# Patient Record
Sex: MALE | Race: BLACK OR AFRICAN AMERICAN | Employment: PART TIME | URBAN - METROPOLITAN AREA
[De-identification: names, ages, dates, MRNs, and addresses within clinical notes are randomized per-mention and may not be internally consistent; named-entity substitution may affect disease eponyms.]

---

## 2017-06-08 ENCOUNTER — HOSPITAL ENCOUNTER (OUTPATIENT)
Dept: RADIATION THERAPY | Age: 67
Discharge: HOME | End: 2017-06-08

## 2017-06-15 ENCOUNTER — HOSPITAL ENCOUNTER (OUTPATIENT)
Dept: MRI IMAGING | Age: 67
Discharge: HOME | End: 2017-06-15
Attending: RADIOLOGY
Payer: MEDICARE

## 2017-06-15 DIAGNOSIS — R97.20 ELEVATED PSA: ICD-10-CM

## 2017-06-15 DIAGNOSIS — C61 MALIGNANT NEOPLASM OF PROSTATE (HCC): ICD-10-CM

## 2017-06-15 DIAGNOSIS — M54.9 BACK PAIN: ICD-10-CM

## 2017-06-15 LAB — CREAT BLD-MCNC: 1.4 MG/DL (ref 0.6–1.3)

## 2017-06-15 PROCEDURE — 72158 MRI LUMBAR SPINE W/O & W/DYE: CPT

## 2017-06-15 PROCEDURE — 74011250636 HC RX REV CODE- 250/636: Performed by: RADIOLOGY

## 2017-06-15 PROCEDURE — 82565 ASSAY OF CREATININE: CPT

## 2017-06-15 PROCEDURE — A9577 INJ MULTIHANCE: HCPCS | Performed by: RADIOLOGY

## 2017-06-15 RX ADMIN — GADOBENATE DIMEGLUMINE 15 ML: 529 INJECTION, SOLUTION INTRAVENOUS at 17:38

## 2018-10-10 ENCOUNTER — HOSPITAL ENCOUNTER (OUTPATIENT)
Age: 68
Setting detail: OUTPATIENT SURGERY
Discharge: HOME | End: 2018-10-10
Attending: INTERNAL MEDICINE | Admitting: INTERNAL MEDICINE
Payer: MEDICARE

## 2018-10-10 ENCOUNTER — ANESTHESIA EVENT (OUTPATIENT)
Dept: ENDOSCOPY | Age: 68
End: 2018-10-10
Payer: MEDICARE

## 2018-10-10 ENCOUNTER — ANESTHESIA (OUTPATIENT)
Dept: ENDOSCOPY | Age: 68
End: 2018-10-10
Payer: MEDICARE

## 2018-10-10 VITALS
RESPIRATION RATE: 15 BRPM | OXYGEN SATURATION: 100 % | DIASTOLIC BLOOD PRESSURE: 57 MMHG | HEIGHT: 69 IN | BODY MASS INDEX: 26.36 KG/M2 | HEART RATE: 55 BPM | SYSTOLIC BLOOD PRESSURE: 116 MMHG | TEMPERATURE: 97.6 F | WEIGHT: 178 LBS

## 2018-10-10 PROCEDURE — 76060000031 HC ANESTHESIA FIRST 0.5 HR: Performed by: INTERNAL MEDICINE

## 2018-10-10 PROCEDURE — 77030035621 HC PRB COAG APC 360DEG ERBE -C: Performed by: INTERNAL MEDICINE

## 2018-10-10 PROCEDURE — 74011250636 HC RX REV CODE- 250/636

## 2018-10-10 PROCEDURE — 77030013992 HC SNR POLYP ENDOSC BSC -B: Performed by: INTERNAL MEDICINE

## 2018-10-10 PROCEDURE — 88305 TISSUE EXAM BY PATHOLOGIST: CPT | Performed by: INTERNAL MEDICINE

## 2018-10-10 PROCEDURE — 76040000019: Performed by: INTERNAL MEDICINE

## 2018-10-10 PROCEDURE — 77030027957 HC TBNG IRR ENDOGTR BUSS -B: Performed by: INTERNAL MEDICINE

## 2018-10-10 RX ORDER — SODIUM CHLORIDE 9 MG/ML
INJECTION, SOLUTION INTRAVENOUS
Status: DISCONTINUED | OUTPATIENT
Start: 2018-10-10 | End: 2018-10-10 | Stop reason: HOSPADM

## 2018-10-10 RX ORDER — SODIUM CHLORIDE 9 MG/ML
50 INJECTION, SOLUTION INTRAVENOUS CONTINUOUS
Status: DISCONTINUED | OUTPATIENT
Start: 2018-10-10 | End: 2018-10-10 | Stop reason: HOSPADM

## 2018-10-10 RX ORDER — EPINEPHRINE 0.1 MG/ML
1 INJECTION INTRACARDIAC; INTRAVENOUS
Status: DISCONTINUED | OUTPATIENT
Start: 2018-10-10 | End: 2018-10-10 | Stop reason: HOSPADM

## 2018-10-10 RX ORDER — FLUMAZENIL 0.1 MG/ML
0.2 INJECTION INTRAVENOUS
Status: DISCONTINUED | OUTPATIENT
Start: 2018-10-10 | End: 2018-10-10 | Stop reason: HOSPADM

## 2018-10-10 RX ORDER — ATORVASTATIN CALCIUM 20 MG/1
20 TABLET, FILM COATED ORAL DAILY
COMMUNITY

## 2018-10-10 RX ORDER — ATROPINE SULFATE 0.1 MG/ML
0.5 INJECTION INTRAVENOUS
Status: DISCONTINUED | OUTPATIENT
Start: 2018-10-10 | End: 2018-10-10 | Stop reason: HOSPADM

## 2018-10-10 RX ORDER — PROPOFOL 10 MG/ML
INJECTION, EMULSION INTRAVENOUS AS NEEDED
Status: DISCONTINUED | OUTPATIENT
Start: 2018-10-10 | End: 2018-10-10 | Stop reason: HOSPADM

## 2018-10-10 RX ORDER — AMLODIPINE BESYLATE 5 MG/1
5 TABLET ORAL DAILY
Status: ON HOLD | COMMUNITY
End: 2021-06-11 | Stop reason: SDUPTHER

## 2018-10-10 RX ORDER — FENTANYL CITRATE 50 UG/ML
100 INJECTION, SOLUTION INTRAMUSCULAR; INTRAVENOUS
Status: DISCONTINUED | OUTPATIENT
Start: 2018-10-10 | End: 2018-10-10 | Stop reason: HOSPADM

## 2018-10-10 RX ORDER — SODIUM CHLORIDE 0.9 % (FLUSH) 0.9 %
5-10 SYRINGE (ML) INJECTION AS NEEDED
Status: DISCONTINUED | OUTPATIENT
Start: 2018-10-10 | End: 2018-10-10 | Stop reason: HOSPADM

## 2018-10-10 RX ORDER — MIDAZOLAM HYDROCHLORIDE 1 MG/ML
.25-1 INJECTION, SOLUTION INTRAMUSCULAR; INTRAVENOUS
Status: DISCONTINUED | OUTPATIENT
Start: 2018-10-10 | End: 2018-10-10 | Stop reason: HOSPADM

## 2018-10-10 RX ORDER — DEXTROMETHORPHAN/PSEUDOEPHED 2.5-7.5/.8
1.2 DROPS ORAL
Status: DISCONTINUED | OUTPATIENT
Start: 2018-10-10 | End: 2018-10-10 | Stop reason: HOSPADM

## 2018-10-10 RX ORDER — SODIUM CHLORIDE 0.9 % (FLUSH) 0.9 %
5-10 SYRINGE (ML) INJECTION EVERY 8 HOURS
Status: DISCONTINUED | OUTPATIENT
Start: 2018-10-10 | End: 2018-10-10 | Stop reason: HOSPADM

## 2018-10-10 RX ORDER — NALOXONE HYDROCHLORIDE 0.4 MG/ML
0.4 INJECTION, SOLUTION INTRAMUSCULAR; INTRAVENOUS; SUBCUTANEOUS
Status: DISCONTINUED | OUTPATIENT
Start: 2018-10-10 | End: 2018-10-10 | Stop reason: HOSPADM

## 2018-10-10 RX ORDER — LIDOCAINE HYDROCHLORIDE 20 MG/ML
INJECTION, SOLUTION EPIDURAL; INFILTRATION; INTRACAUDAL; PERINEURAL AS NEEDED
Status: DISCONTINUED | OUTPATIENT
Start: 2018-10-10 | End: 2018-10-10 | Stop reason: HOSPADM

## 2018-10-10 RX ADMIN — PROPOFOL 20 MG: 10 INJECTION, EMULSION INTRAVENOUS at 13:21

## 2018-10-10 RX ADMIN — PROPOFOL 20 MG: 10 INJECTION, EMULSION INTRAVENOUS at 13:25

## 2018-10-10 RX ADMIN — PROPOFOL 20 MG: 10 INJECTION, EMULSION INTRAVENOUS at 13:31

## 2018-10-10 RX ADMIN — PROPOFOL 20 MG: 10 INJECTION, EMULSION INTRAVENOUS at 13:33

## 2018-10-10 RX ADMIN — SODIUM CHLORIDE: 9 INJECTION, SOLUTION INTRAVENOUS at 13:03

## 2018-10-10 RX ADMIN — PROPOFOL 20 MG: 10 INJECTION, EMULSION INTRAVENOUS at 13:28

## 2018-10-10 RX ADMIN — LIDOCAINE HYDROCHLORIDE 40 MG: 20 INJECTION, SOLUTION EPIDURAL; INFILTRATION; INTRACAUDAL; PERINEURAL at 13:19

## 2018-10-10 RX ADMIN — PROPOFOL 50 MG: 10 INJECTION, EMULSION INTRAVENOUS at 13:19

## 2018-10-10 NOTE — PROCEDURES
118 [facility].  [address] [place] [address] [address], [address]  [phone]                              Colonoscopy Procedure Note      Indications:    Lower rectal bleeding     :  Carla Chawla MD    Referring Provider: Venancoi Crowder MD    Sedation:  MAC anesthesia    Procedure Details:  After informed consent was obtained with all risks and benefits of procedure explained and preoperative exam completed, the patient was taken to the endoscopy suite and placed in the left lateral decubitus position. Upon sequential sedation as per above, a digital rectal exam was performed  And was normal.  The Olympus videocolonoscope  was inserted in the rectum and carefully advanced to the cecum, which was identified by the ileocecal valve and appendiceal orifice. The quality of preparation was good. The colonoscope was slowly withdrawn with careful evaluation between folds. Retroflexion in the rectum was performed and was normal..     Findings:   Rectum: Grade 2 internal hemorrhoid(s); Patchy erythema with neovascularization was noted in distal rectum. This is consistent with radiation proctitis  Sigmoid: no mucosal lesion appreciated  Descending Colon: 1  Sessile polyp(s), the largest 4 mm in size;  Transverse Colon: no mucosal lesion appreciated  Ascending Colon: no mucosal lesion appreciated  Cecum: no mucosal lesion appreciated  Terminal Ileum: not intubated    Interventions:  1 complete polypectomy were performed using cold biopsy forceps and the polyps were  retrieved  APC of rectum at 1L/min and 40 azevedo was successful    Specimen Removed:    ID Type Source Tests Collected by Time Destination   1 : Descending Colon Polyp Preservative   Carla Chawla MD 10/10/2018 1329 Pathology       Complications: None. EBL:  None. Recommendations: -Await pathology. -Repeat colonoscopy in 5 years.  -High fiber diet.  -Follow symptoms  -Resume normal medication(s).      Discharge Disposition:  Home in the company of a  when able to ambulate.     Calixto Simon MD  10/10/2018  1:40 PM

## 2018-10-10 NOTE — H&P
[facility]  217 [facility] 140 [facility], [address]  [phone]                                History and Physical     NAME: Michael Mayer   :  1950   MRN:  [id number]     HPI:  The patient was seen and examined. Past Surgical History:   Procedure Laterality Date    HX BACK SURGERY      HX ORTHOPAEDIC Left     for torn meniscus    HX PROSTATECTOMY       Past Medical History:   Diagnosis Date    Cancer [facility])     prostate x 2 -  - prostate removed  and radiation     Hypertension     Ill-defined condition     increased cholesterol     Social History   Substance Use Topics    Smoking status: Never Smoker    Smokeless tobacco: Never Used    Alcohol use No     No Known Allergies  Family History   Problem Relation Age of Onset    Cancer Father      prostate    MS Sister     Cancer Brother      ? where    Alcohol abuse Brother     Other Sister      \"something in her pancreas\"     Current Facility-Administered Medications   Medication Dose Route Frequency    0.9% sodium chloride infusion  50 mL/hr IntraVENous CONTINUOUS    sodium chloride (NS) flush 5-10 mL  5-10 mL IntraVENous Q8H    sodium chloride (NS) flush 5-10 mL  5-10 mL IntraVENous PRN    midazolam (VERSED) injection 0.25-10 mg  0.25-10 mg IntraVENous Multiple    fentaNYL citrate (PF) injection 100 mcg  100 mcg IntraVENous MULTIPLE DOSE GIVEN    naloxone (NARCAN) injection 0.4 mg  0.4 mg IntraVENous Multiple    flumazenil (ROMAZICON) 0.1 mg/mL injection 0.2 mg  0.2 mg IntraVENous Multiple    simethicone (MYLICON) 91FJ/4.8WQ oral drops 80 mg  1.2 mL Oral Multiple    atropine injection 0.5 mg  0.5 mg IntraVENous ONCE PRN    EPINEPHrine (ADRENALIN) 0.1 mg/mL syringe 1 mg  1 mg Endoscopically ONCE PRN         PHYSICAL EXAM:  General: WD, WN. Alert, cooperative, no acute distress    HEENT: NC, Atraumatic. PERRLA, EOMI. Anicteric sclerae. Lungs:  CTA Bilaterally.  No Wheezing/Rhonchi/Rales. Heart:  Regular  rhythm,  No murmur, No Rubs, No Gallops  Abdomen: Soft, Non distended, Non tender.  +Bowel sounds, no HSM  Extremities: No c/c/e  Neurologic:  CN 2-12 gi, Alert and oriented X 3. No acute neurological distress   Psych:   Good insight. Not anxious nor agitated. The heart, lungs and mental status were satisfactory for the administration of MAC sedation and for the procedure.       Mallampati score: 3       Assessment:   · Rectal bleeding    Plan:   · Endoscopic procedure  · MAC sedation   ·

## 2018-10-10 NOTE — ANESTHESIA POSTPROCEDURE EVALUATION
Post-Anesthesia Evaluation and Assessment Patient: Abida James MRN: [id number]  SSN: [id number] YOB: 1950  Age: 77 y.o. Sex: male Cardiovascular Function/Vital Signs Visit Vitals  /57  Pulse (!) 55  Temp 36.4 °C (97.6 °F)  Resp 15  Ht 5' 9\" (1.753 m)  Wt 80.7 kg (178 lb)  SpO2 100%  BMI 26.29 kg/m2 Patient is status post MAC anesthesia for Procedure(s): 
COLONOSCOPY 
ENDOSCOPIC POLYPECTOMY 
ENDOSCOPIC ARGON PLASMA COAGULATION. Nausea/Vomiting: None Postoperative hydration reviewed and adequate. Pain: 
Pain Scale 1: Numeric (0 - 10) (10/10/18 1352) Pain Intensity 1: 0 (10/10/18 1352) Managed Neurological Status: At baseline Mental Status and Level of Consciousness: Arousable Pulmonary Status:  
O2 Device: Room air (10/10/18 1352) Adequate oxygenation and airway patent Complications related to anesthesia: None Post-anesthesia assessment completed. No concerns Signed By: Donita Horn MD   
 October 10, 2018

## 2018-10-10 NOTE — ROUTINE PROCESS
Addis Macias  1950  [id number]    Situation:  Verbal report received from: Mamta Dong RN  Procedure: Procedure(s):  COLONOSCOPY  ENDOSCOPIC POLYPECTOMY  ENDOSCOPIC ARGON PLASMA COAGULATION    Background:    Preoperative diagnosis: CHANGE IN BOWEL HABIT, RECTAL HEMORRHAGE, MALIGNANT TUMOR OF PROSTATE  Postoperative diagnosis: 1.- Hemorrhoids  2.- Radiation Proctatis  3.- Colonic Polyp    :  Dr. Adolfo Rodriguez  Assistant(s): Endoscopy Technician-1: Jared Toribio  Endoscopy RN-1: Senthil Romero RN    Specimens:   ID Type Source Tests Collected by Time Destination   1 : Descending Colon Polyp Preservative   Kelly Aldana MD 10/10/2018 1329 Pathology     H. Pylori  no    Assessment:  Intra-procedure medications   Anesthesia gave intra-procedure sedation and medications, see anesthesia flow sheet yes    Intravenous fluids: NS@ KVO     Vital signs stable     Abdominal assessment: round and soft     Recommendation:  Discharge patient per MD order.     Family or Friend   Permission to share finding with family or friend yes

## 2018-10-10 NOTE — DISCHARGE INSTRUCTIONS
[facility].  217 [facility] 730 [facility]                                  Saravanan Lainez  [id number]  1950    COLON DISCHARGE INSTRUCTIONS    DISCOMFORT:  Redness at IV site- apply warm compress to area; if redness or soreness persist- contact your physician  There may be a slight amount of blood passed from the rectum  Gaseous discomfort- walking, belching will help relieve any discomfort  You may not operate a vehicle for 12 hours  You may not  engage in an occupation involving machinery or appliances for rest of today  You may not  drink alcoholic beverages for at least 12 hours  Avoid making any critical decisions for at least 24 hour    DIET:   High fiber diet. - however -  remember your colon is empty and a heavy meal will produce gas. Avoid these foods:  vegetables, fried / greasy foods, carbonated drinks for today     ACTIVITY:  It is recommended that you spend the remainder of the day resting -  avoid any strenuous activity. CALL M.D. ANY SIGN OF:   Increasing pain, nausea, vomiting  Abdominal distension (swelling)  New increased bleeding (oral or rectal)  Fever (chills)  Pain in chest area  Bloody discharge from nose or mouth  Shortness of breath    You may not  take any Advil, Aspirin, Ibuprofen, Motrin, Aleve, or Goodys for 5 days, ONLY  Tylenol as needed for pain. Post procedure diagnosis:  1.- Hemorrhoids  2.- Radiation Proctatis  3.- Colonic Polyp    Follow-up Instructions:    Call Dr. Renetta Sinclair for any questions or problems. If we took a biopsy please call the office within 2 weeks to discuss your                             pathology results.  Telephone # [phone]

## 2018-10-10 NOTE — IP AVS SNAPSHOT
[address] 
[phone] Patient: Debbie De Anda MRN: [id number] PQK:7[birth year] About your hospitalization You were admitted on:  October 10, 2018 You last received care in the:  [facility] ENDOSCOPY You were discharged on:  October 10, 2018 Why you were hospitalized Your primary diagnosis was:  Not on File Follow-up Information None Discharge Orders None A check jasmine indicates which time of day the medication should be taken. My Medications CONTINUE taking these medications Instructions Each Dose to Equal  
 Morning Noon Evening Bedtime  
 amLODIPine 5 mg tablet Commonly known as:  Miguel [place] Your last dose was: Your next dose is: Take 5 mg by mouth daily. 5 mg  
    
   
   
   
  
 atorvastatin 20 mg tablet Commonly known as:  LIPITOR Your last dose was: Your next dose is: Take 20 mg by mouth daily. 20 mg VENTOLIN HFA IN Your last dose was: Your next dose is: Take 2 Puffs by inhalation. 2 Puff Discharge Instructions [address]. 
[address] [place] [address] [address] [place] [address] [address]  
[phone] Debbie De Anda [id number] 
1950 COLON DISCHARGE INSTRUCTIONS DISCOMFORT: 
Redness at IV site- apply warm compress to area; if redness or soreness persist- contact your physician There may be a slight amount of blood passed from the rectum Gaseous discomfort- walking, belching will help relieve any discomfort You may not operate a vehicle for 12 hours You may not  engage in an occupation involving machinery or appliances for rest of today You may not  drink alcoholic beverages for at least 12 hours Avoid making any critical decisions for at least 24 hour DIET: 
 High fiber diet.  however -  remember your colon is empty and a heavy meal will produce gas. Avoid these foods:  vegetables, fried / greasy foods, carbonated drinks for today ACTIVITY: It is recommended that you spend the remainder of the day resting -  avoid any strenuous activity. CALL M.D. ANY SIGN OF: Increasing pain, nausea, vomiting Abdominal distension (swelling) New increased bleeding (oral or rectal) Fever (chills) Pain in chest area Bloody discharge from nose or mouth Shortness of breath You may not  take any Advil, Aspirin, Ibuprofen, Motrin, Aleve, or Goodys for 5 days, ONLY  Tylenol as needed for pain. Post procedure diagnosis:  1.- Hemorrhoids 2.- Radiation Proctatis 3.- Colonic Polyp Follow-up Instructions: 
 
Call Dr. Tenisha Mosley for any questions or problems. If we took a biopsy please call the office within 2 weeks to discuss your                             pathology results. Telephone # [phone] Introducing [facility]! [facility] introduces [employer] patient portal. Now you can access parts of your medical record, email your doctor's office, and request medication refills online. 1. In your internet browser, go to [email or web address] 2. Click on the First Time User? Click Here link in the Sign In box. You will see the New Member Sign Up page. 3. Enter your [employer] Access Code exactly as it appears below. You will not need to use this code after youve completed the sign-up process. If you do not sign up before the expiration date, you must request a new code. · [employer] Access Code: [email or web address] 
Expires: 1/8/2019  1:57 PM 
 
4. Enter the last four digits of your Social Security Number (xxxx) and Date of Birth (mm/dd/yyyy) as indicated and click Submit. You will be taken to the next sign-up page. 5. Create a [employer] ID.  This will be your [employer] login ID and cannot be changed, so think of one that is secure and easy to remember. 6. Create a [employer]Nanza password. You can change your password at any time. 7. Enter your Password Reset Question and Answer. This can be used at a later time if you forget your password. 8. Enter your e-mail address. You will receive e-mail notification when new information is available in [address] 9. Click Sign Up. You can now view and download portions of your medical record. 10. Click the Download Summary menu link to download a portable copy of your medical information. If you have questions, please visit the Frequently Asked Questions section of the [employer] website. Remember, [employer] is NOT to be used for urgent needs. For medical emergencies, dial 911. Now available from your iPhone and Android! Introducing [employer] As a [employer] patient, I wanted to make you aware of our electronic visit tool called [employer]. [employer] 24/7 allows you to connect within minutes with a medical provider 24 hours a day, seven days a week via a mobile device or tablet or logging into a secure website from your computer. You can access Rivas Celaya from anywhere in the [place]. A virtual visit might be right for you when you have a simple condition and feel like you just dont want to get out of bed, or cant get away from work for an appointment, when your regular [employer] provider is not available (evenings, weekends or holidays), or when youre out of town and need minor care. Electronic visits cost only $49 and if the [employer] 24/7 provider determines a prescription is needed to treat your condition, one can be electronically transmitted to a nearby pharmacy*. Please take a moment to enroll today if you have not already done so. The enrollment process is free and takes just a few minutes.   To enroll, please download the [employer] 24/7 ramiro to your tablet or phone, or visit [email or web address] to enroll on your computer. And, as an [address] [place] [address] patient with a [employer] account, the results of your visits will be scanned into your electronic medical record and your primary care provider will be able to view the scanned results. We urge you to continue to see your regular [facility] provider for your ongoing medical care. And while your primary care provider may not be the one available when you seek a [employer] virtual visit, the peace of mind you get from getting a real diagnosis real time can be priceless. For more information on [employer], view our Frequently Asked Questions (FAQs) at [email or web address]. Sincerely, 
 
Marva Ferrer MD 
Chief Medical Officer [address] *:  certain medications cannot be prescribed via [employer] Providers Seen During Your Hospitalization Provider Specialty Primary office phone Rodrick Samuel MD Gastroenterology [phone] Your Primary Care Physician (PCP) Primary Care Physician Office Phone Office Fax Duc Dance [phone] You are allergic to the following No active allergies Recent Documentation Height Weight BMI Smoking Status 1.753 m 80.7 kg 26.29 kg/m2 Never Smoker Emergency Contacts Name Discharge Info Relation Home Work Mobile [address] [employer] CAREGIVER [3] Spouse [3] [phone] Patient Belongings The following personal items are in your possession at time of discharge: 
  Dental Appliances: Uppers (2 lower middle teeth are loose)  Visual Aid: None Please provide this summary of care documentation to your next provider. Signatures-by signing, you are acknowledging that this After Visit Summary has been reviewed with you and you have received a copy.   
  
 
  
    
    
 Patient Signature: ____________________________________________________________ Date:  ____________________________________________________________  
  
Lucila Roof Provider Signature:  ____________________________________________________________ Date:  ____________________________________________________________

## 2018-10-10 NOTE — ANESTHESIA PREPROCEDURE EVALUATION
Anesthetic History No history of anesthetic complications Review of Systems / Medical History Patient summary reviewed, nursing notes reviewed and pertinent labs reviewed Pulmonary Within defined limits Neuro/Psych Within defined limits Cardiovascular Hypertension: well controlled GI/Hepatic/Renal 
Within defined limits Endo/Other Within defined limits Other Findings Physical Exam 
 
Airway Mallampati: II 
TM Distance: > 6 cm Neck ROM: normal range of motion Mouth opening: Normal 
 
 Cardiovascular Regular rate and rhythm,  S1 and S2 normal,  no murmur, click, rub, or gallop Dental 
No notable dental hx Pulmonary Breath sounds clear to auscultation Abdominal 
GI exam deferred Other Findings Anesthetic Plan ASA: 2 Anesthesia type: MAC Induction: Intravenous Anesthetic plan and risks discussed with: Patient

## 2019-03-29 ENCOUNTER — HOSPITAL ENCOUNTER (EMERGENCY)
Age: 69
Discharge: HOME | End: 2019-03-30
Attending: EMERGENCY MEDICINE
Payer: MEDICARE

## 2019-03-29 VITALS
TEMPERATURE: 97.7 F | HEART RATE: 83 BPM | RESPIRATION RATE: 18 BRPM | DIASTOLIC BLOOD PRESSURE: 74 MMHG | OXYGEN SATURATION: 97 % | SYSTOLIC BLOOD PRESSURE: 173 MMHG

## 2019-03-29 DIAGNOSIS — R33.9 URINARY RETENTION: Primary | ICD-10-CM

## 2019-03-29 PROCEDURE — 74011000250 HC RX REV CODE- 250: Performed by: EMERGENCY MEDICINE

## 2019-03-29 PROCEDURE — 77030005518 HC CATH URETH FOL 2W BARD -B

## 2019-03-29 PROCEDURE — 99282 EMERGENCY DEPT VISIT SF MDM: CPT

## 2019-03-29 PROCEDURE — 51702 INSERT TEMP BLADDER CATH: CPT

## 2019-03-29 RX ORDER — LIDOCAINE HYDROCHLORIDE 20 MG/ML
JELLY TOPICAL
Status: COMPLETED | OUTPATIENT
Start: 2019-03-29 | End: 2019-03-29

## 2019-03-29 RX ADMIN — LIDOCAINE HYDROCHLORIDE: 20 JELLY TOPICAL at 23:57

## 2019-03-30 LAB
ANION GAP SERPL CALC-SCNC: 6 MMOL/L (ref 5–15)
APPEARANCE UR: CLEAR
BACTERIA URNS QL MICRO: NEGATIVE /HPF
BILIRUB UR QL: NEGATIVE
BUN SERPL-MCNC: 17 MG/DL (ref 6–20)
BUN/CREAT SERPL: 7 (ref 12–20)
CALCIUM SERPL-MCNC: 8.6 MG/DL (ref 8.5–10.1)
CHLORIDE SERPL-SCNC: 108 MMOL/L (ref 97–108)
CO2 SERPL-SCNC: 26 MMOL/L (ref 21–32)
COLOR UR: ABNORMAL
CREAT SERPL-MCNC: 2.41 MG/DL (ref 0.7–1.3)
EPITH CASTS URNS QL MICRO: ABNORMAL /LPF
GLUCOSE SERPL-MCNC: 136 MG/DL (ref 65–100)
GLUCOSE UR STRIP.AUTO-MCNC: NEGATIVE MG/DL
HGB UR QL STRIP: ABNORMAL
HYALINE CASTS URNS QL MICRO: ABNORMAL /LPF (ref 0–5)
KETONES UR QL STRIP.AUTO: NEGATIVE MG/DL
LEUKOCYTE ESTERASE UR QL STRIP.AUTO: ABNORMAL
NITRITE UR QL STRIP.AUTO: NEGATIVE
PH UR STRIP: 6 [PH] (ref 5–8)
POTASSIUM SERPL-SCNC: 4.3 MMOL/L (ref 3.5–5.1)
PROT UR STRIP-MCNC: ABNORMAL MG/DL
RBC #/AREA URNS HPF: ABNORMAL /HPF (ref 0–5)
SODIUM SERPL-SCNC: 140 MMOL/L (ref 136–145)
SP GR UR REFRACTOMETRY: 1.01 (ref 1–1.03)
UR CULT HOLD, URHOLD: NORMAL
UROBILINOGEN UR QL STRIP.AUTO: 0.2 EU/DL (ref 0.2–1)
WBC URNS QL MICRO: ABNORMAL /HPF (ref 0–4)

## 2019-03-30 PROCEDURE — 81001 URINALYSIS AUTO W/SCOPE: CPT

## 2019-03-30 PROCEDURE — 80048 BASIC METABOLIC PNL TOTAL CA: CPT

## 2019-03-30 PROCEDURE — 51702 INSERT TEMP BLADDER CATH: CPT

## 2019-03-30 PROCEDURE — 36415 COLL VENOUS BLD VENIPUNCTURE: CPT

## 2019-03-30 NOTE — DISCHARGE INSTRUCTIONS
Patient Education        Urinary Retention: Care Instructions  Your Care Instructions    Urinary retention means that you aren't able to urinate. In men, it is often caused by a blockage of the urinary tract from an enlarged prostate gland. In men and women, it can also be caused by an infection or nerve damage. Or it may be a side effect of a medicine. The doctor may have drained the urine from your bladder. If you still have problems passing urine, you may need to use a catheter at home. This is used to empty your bladder until the problem can be fixed. Your doctor may put a catheter in your bladder before you go home. If so, he or she will tell you when to come back to have the catheter removed. The doctor has checked you closely. But problems can develop later. If you notice any problems or new symptoms, get medical treatment right away. Follow-up care is a key part of your treatment and safety. Be sure to make and go to all appointments, and call your doctor if you are having problems. It's also a good idea to know your test results and keep a list of the medicines you take. How can you care for yourself at home? · Take your medicines exactly as prescribed. Call your doctor if you think you are having a problem with your medicine. You will get more details on the specific medicines your doctor prescribes. · Check with your doctor before you use any over-the-counter medicines. Many cold and allergy medicines, for example, can make this problem worse. Make sure your doctor knows all of the medicines, vitamins, supplements, and herbal remedies you take. · Spread out through the day the amount of fluid you drink. Do not drink a lot at bedtime. · Avoid alcohol and caffeine. · If you have been given a catheter, or if one is already in place, follow the instructions you were given. Always wash your hands before and after you handle the catheter. When should you call for help?   Call your doctor now or seek immediate medical care if:    · You cannot urinate at all, or it is getting harder to urinate.     · You have symptoms of a urinary tract infection. These may include:  ? Pain or burning when you urinate. ? A frequent need to urinate without being able to pass much urine. ? Pain in the flank, which is just below the rib cage and above the waist on either side of the back. ? Blood in your urine. ? A fever.    Watch closely for changes in your health, and be sure to contact your doctor if:    · You have any problems with your catheter.     · You do not get better as expected. Where can you learn more? Go to http://yarelis-jessi.info/. Enter M244 in the search box to learn more about \"Urinary Retention: Care Instructions. \"  Current as of: March 20, 2018  Content Version: 11.9  © 9928-8666 [employer]. Care instructions adapted under license by [employer] (which disclaims liability or warranty for this information). If you have questions about a medical condition or this instruction, always ask your healthcare professional. [place] [address] any warranty or liability for your use of this information. Patient Education        Learning About Urinary Catheter Care to Prevent Infection  What is a urinary catheter? A urinary catheter is a flexible plastic tube used to drain urine from your bladder when you can't urinate on your own. The catheter allows urine to drain from the bladder into a bag. Two types of drainage bags may be used with a urinary catheter. · A bedside bag is a large bag that you can hang on the side of your bed or on a chair. You can use it overnight or anytime you will be sitting or lying down for a long time. · A leg bag is a small bag that you can use during the day. It is usually attached to your thigh or calf and hidden under your clothes. Having a urinary catheter increases your risk of getting a urinary tract infection. Germs may get on the catheter and cause an infection in your bladder or kidneys. The longer you have a catheter, the more likely it is that you will get an infection. You can help prevent this problem with good hygiene and careful handling of your catheter and drainage bags. How can you help prevent infection? Take care to be clean  · Always wash your hands well before and after you handle your catheter. · Clean the skin around the catheter twice a day using soap and water. Dry with a clean towel afterward. You can shower with your catheter and drainage bag in place unless your doctor told you not to. · When you clean around the catheter, check the surrounding skin for signs of infection. Look for things like pus or irritated, swollen, red, or tender skin around the catheter. Be careful with your drainage bag  · Always keep the drainage bag below the level of your bladder. This will help keep urine from flowing back into your bladder. · Check often to see that urine is flowing through the catheter into the drainage bag. · Empty the drainage bag when it is half full. This will keep it from overflowing or backing up. · When you empty the drainage bag, do not let the tubing or drain spout touch anything. Be careful with your catheter  · Do not unhook the catheter from the drain tube. That could let germs get into the tube. · Make sure that the catheter tubing does not get twisted or kinked. · Do not tug or pull on the catheter. And make sure that the drainage bag does not drag or pull on the catheter. · Do not put powder or lotion on the skin around the catheter. · Talk with your doctor about your options for sexual intercourse while wearing a catheter. How do you empty a urine drainage bag? If your doctor has asked you to keep a record, write down the amount of urine in the bag before you empty it. Wash your hands before and after you touch the bag.   1. Remove the drain spout from its sleeve at the bottom of the drainage bag.  2. Open the valve on the drain spout. Let the urine flow out into the toilet or a container. Be careful not to let the tubing or drain spout touch anything. 3. After you empty the bag, close the valve. Then put the drain spout back into its sleeve at the bottom of the collection bag. How do you add a bedside bag to a leg bag? Wash your hands before and after you handle the bags. 1. Empty the leg bag attached to the catheter. 2. Put a clean towel under the leg bag.  3. Use an alcohol wipe to clean the tip of the bedside bag. Then connect the bedside bag to the leg bag. How can you clean a bedside drainage bag? Many people clean their bedside bag in the morning if they switch to a leg bag. To clean a bedside drainage ba. Remove the bedside bag from the leg bag.  2. Fill the bag with 2 parts vinegar and 3 parts water. Let it stand for 20 minutes. 3. Empty the bag, and let it air dry. When should you call for help? Call your doctor now or seek immediate medical care if:  · You have symptoms of a urinary infection. These may include:  ? Pain or burning when you urinate. ? A frequent need to urinate without being able to pass much urine. ? Pain in the flank, which is just below the rib cage and above the waist on either side of the back. ? Blood in your urine. ? A fever. · Your urine smells bad. · You see large blood clots in your urine. · No urine or very little urine is flowing into the bag for 4 or more hours. Watch closely for changes in your health, and be sure to contact your doctor if:  · The area around the catheter becomes irritated, swollen, red, or tender, or there is pus draining from it. · Urine is leaking from the place where the catheter enters your body. Follow-up care is a key part of your treatment and safety. Be sure to make and go to all appointments, and call your doctor if you are having problems.  It's also a good idea to know your test results and keep a list of the medicines you take. Where can you learn more? Go to http://yarelis-jessi.info/. Enter U010 in the search box to learn more about \"Learning About Urinary Catheter Care to Prevent Infection. \"  Current as of: March 20, 2018  Content Version: 11.9  © 6480-6270 [employer]. Care instructions adapted under license by [employer] (which disclaims liability or warranty for this information). If you have questions about a medical condition or this instruction, always ask your healthcare professional. [address] any warranty or liability for your use of this information.

## 2019-03-30 NOTE — ED PROVIDER NOTES
77 y.o. male with past medical history significant for HTN, high cholesterol, and prostate cancer who presents from home via private vehicle with chief complaint of urinary retention. Patient states he had a cystoscopy this morning, and ever since discharge around 1500 (8.5 hours pta) he has been unable to urinate. Patient c/o moderate \"pressure, cramping\" to low abdomen, suprapubic region. Patient reports history of prostate cancer, diagnosed in 2008. He subsequently underwent a prostatectomy. Approximately 1 year ago, patient's PSA noted to be elevated. He underwent radiation. Patient experienced symptoms of urinary frequency. One month ago, he had a cystoscopy performed resulting in persistent hematuria. He underwent this second cystoscopy today to cauterize. Patient denies urinary incontinence following first procedure. He was discharged today on Levaquin. Pt denies fever, nausea or vomiting. He takes medication for high blood pressure regularly. There are no other acute medical concerns at this time. Social hx: Never smoker PCP: Elke Meza MD 
 
Note written by Yuliya Barajas, as dictated by [facility], DO 11:40 PM 
 
 
The history is provided by the patient. No  was used. Past Medical History:  
Diagnosis Date  Cancer [facility])   
 prostate x 2 - 2008/2016 - prostate removed 2008 and radiation 2017  Hypertension  Ill-defined condition   
 increased cholesterol Past Surgical History:  
Procedure Laterality Date  COLONOSCOPY N/A 10/10/2018 COLONOSCOPY performed by Thien Alcocer MD at [place] [address] HX ORTHOPAEDIC Left   
 for torn meniscus  HX PROSTATECTOMY  2008 Family History:  
Problem Relation Age of Onset  Cancer Father   
     prostate  MS Sister  Cancer Brother ? where  Alcohol abuse Brother  Other Sister \"something in her pancreas\" Social History Socioeconomic History  Marital status:  Spouse name: Not on file  Number of children: Not on file  Years of education: Not on file  Highest education level: Not on file Occupational History  Not on file Social Needs  Financial resource strain: Not on file  Food insecurity:  
  Worry: Not on file Inability: Not on file  Transportation needs:  
  Medical: Not on file Non-medical: Not on file Tobacco Use  Smoking status: Never Smoker  Smokeless tobacco: Never Used Substance and Sexual Activity  Alcohol use: No  
 Drug use: Not on file  Sexual activity: Yes  
  Partners: Female Lifestyle  Physical activity:  
  Days per week: Not on file Minutes per session: Not on file  Stress: Not on file Relationships  Social connections:  
  Talks on phone: Not on file Gets together: Not on file Attends Voodoo service: Not on file Active member of club or organization: Not on file Attends meetings of clubs or organizations: Not on file Relationship status: Not on file  Intimate partner violence:  
  Fear of current or ex partner: Not on file Emotionally abused: Not on file Physically abused: Not on file Forced sexual activity: Not on file Other Topics Concern  Not on file Social History Narrative  Not on file ALLERGIES: Patient has no known allergies. Review of Systems Constitutional: Negative for fever. Gastrointestinal: Positive for abdominal pain (suprapubic). Negative for nausea and vomiting. Genitourinary: Positive for difficulty urinating. Negative for hematuria and urgency. Musculoskeletal: Negative for back pain and neck pain. All other systems reviewed and are negative. Vitals:  
 03/29/19 2325 03/29/19 2347 BP:  173/74 Pulse:  83 Resp:  18 Temp:  97.7 °F (36.5 °C) SpO2: 98% 97% Physical Exam  
  
 Constitutional: Pt is awake and alert. Pt appears well-developed and well-nourished. NAD. HENT:  
Head: Normocephalic and atraumatic. Nose: Nose normal.  
Mouth/Throat: Oropharynx is clear and moist. No oropharyngeal exudate. Eyes: Conjunctivae and extraocular motions are normal. Pupils are equal, round, and reactive to light. Right eye exhibits no discharge. Left eye exhibits no discharge. No scleral icterus. Neck: No tracheal deviation present. Supple neck. Cardiovascular: Normal rate, regular rhythm, normal heart sounds and intact distal pulses. Exam reveals no gallop and no friction rub. No murmur heard. Pulmonary/Chest: Effort normal and breath sounds normal.  Pt  has no wheezes. Pt  has no rales. Abdominal: Soft. Pt  exhibits no distension and no mass. Tenderness in suprapubic region. Pt  has no rebound and no guarding. Musculoskeletal:  Pt  exhibits no edema and no tenderness. Ext: Normal ROM in all four extremities; not tender to palpation; distal pulses are normal, no edema. Neurological:  Pt is alert. nonfocal neuro exam. 
Skin: Skin is warm and dry. Pt  is not diaphoretic. Psychiatric:  Pt  has a normal mood and affect. Behavior is normal.  
Note written by Yuliya Burroughs, as dictated by Arvind Tapia DO 1:34 AM 
 
MDM Procedures All sx resolved after marti placed 500 cc clear urine out Labs Reviewed URINALYSIS W/MICROSCOPIC - Abnormal; Notable for the following components:  
    Result Value Protein TRACE (*) Blood LARGE (*) Leukocyte Esterase SMALL (*) All other components within normal limits METABOLIC PANEL, BASIC - Abnormal; Notable for the following components:  
 Glucose 136 (*) Creatinine 2.41 (*)   
 BUN/Creatinine ratio 7 (*)   
 GFR est AA 32 (*)   
 GFR est non-AA 27 (*) All other components within normal limits URINE CULTURE HOLD SAMPLE  
SAMPLES BEING HELD  
 
[facility] Urology f/u

## 2019-05-17 ENCOUNTER — TELEPHONE (OUTPATIENT)
Dept: WOUND CARE | Age: 69
End: 2019-05-17

## 2019-05-17 ENCOUNTER — HOSPITAL ENCOUNTER (OUTPATIENT)
Dept: WOUND CARE | Age: 69
Discharge: HOME | End: 2019-05-17
Payer: MEDICARE

## 2019-05-17 VITALS
RESPIRATION RATE: 16 BRPM | DIASTOLIC BLOOD PRESSURE: 78 MMHG | SYSTOLIC BLOOD PRESSURE: 166 MMHG | HEART RATE: 62 BPM | TEMPERATURE: 98.2 F
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
## 2019-05-17 PROBLEM — L59.8 SOFT TISSUE RADIONECROSIS: Status: ACTIVE | Noted: 2019-05-17

## 2019-05-17 PROBLEM — N30.41 HEMATURIA DUE TO IRRADIATION CYSTITIS: Status: ACTIVE | Noted: 2019-05-17

## 2019-05-17 PROCEDURE — 99213 OFFICE O/P EST LOW 20 MIN: CPT

## 2019-05-17 NOTE — TELEPHONE ENCOUNTER
Magdy phone call from [facility], contact phone number: [phone]. Fax number [phone]. New contact at facility.

## 2019-05-17 NOTE — H&P
[address] HISTORY AND PHYSICAL Name:  Mauro Rojas 
MR#:  [id number] :  1950 ACCOUNT #:  [de-identified] ADMIT DATE:  2019 HISTORY OF PRESENT ILLNESS:  The patient was referred by Dr. 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 His primary care doctor is Dr. Chris Hernandez. The patient has a history of having prostate cancer originally in  with a prostatectomy. About 2 years ago, his PSA started increasing. He then underwent 38 treatments of external beam radiation for 6840 josh between 2017 and 10/17/2017 over 52 days. He started developing hemorrhagic cystitis with flaquito hematuria in 2019. He underwent cystoscopy in February. At that time, he had a basic metabolic screen, which showed a blood sugar of 136 and GFR decreased at 32 and creatinine elevated at 2.4. The patient has had continuous urinary bleeding. He underwent another cystoscopy and cauterization in late 2019. He was then seen in the emergency room for urinary retention and since that time, has still had episodes of bleeding along with passing of clots. HABITS:  He denies cigarettes or alcohol. ALLERGIES:  HE HAS NO KNOWN DRUG ALLERGIES. MEDICATIONS:  Atorvastatin, amlodipine, albuterol inhaler p.r.n. REVIEW OF SYSTEMS:  Hypertension, elevated cholesterol along with previously noted prostate cancer. PAST SURGICAL HISTORY:  Previous operations:  Previously noted cystoscopy, torn meniscus repair, back surgery, and prostatectomy. FAMILY HISTORY:  Prostate cancer in his father, multiple sclerosis in his sister, alcohol abuse in his brother, cancer in his brother. PHYSICAL EXAMINATION: 
GENERAL:  The patient is awake, alert, and conversant. He ambulates without difficulty. VITAL SIGNS:  His temperature is 98.2, pulse 62, respirations 16, blood pressure 166/78. HEENT:  Oral cavity, oropharynx, palpation of neck is normal.  Both tympanic membranes and middle ears spaces are clear. LUNGS:  Clear to auscultation and percussion. HEART:  A holosystolic murmur is heard. ABDOMEN:  Soft, nontender. No organomegaly. BACK:  Nontender to palpation. EXTREMITIES:  Upper extremities:  Equal tone and strength bilaterally. Lower extremities:  Equal tone and strength bilaterally. No edema. NEUROLOGIC:  Cranial nerves II-XII grossly intact. Upon questioning, the patient has never had a seizure. He has never had a pneumothorax. He has never had any ear problems. He has never had issues with his vision. We discussed hyperbaric oxygen, its indications as well as potential risk factors such as ear pain with the need for tube insertion if he becomess symptomatic. We discussed pneumothorax and how that is treated. We also discussed the possibility of seizures from elevated levels of oxygen and why we use two 5-minute air breaks when doing 2.5 atmospheres of pressurized oxygen. Because of the patient having an elevated blood sugar at one time, I am going to get a hemoglobin A1c just to make sure that he is not a diabetic and not at risk for getting hypoglycemic while undergoing hyperbaric oxygen, which is a common finding with diabetics. All questions were answered. His condition on discharge is stable. He understands that our goal is to work with the urologist and try to minimize the amount of bleeding, but we do not expect there to be an absolute cure or resolution from HBO without the assistance of, and treatment by, his urologist. 
 
 
Mil Leblanc MD 
 
 
[id number] 
D:  05/17/2019 10:06 
T:  05/17/2019 10:16 JOB #:  [id number] CC:  MD Tonio Alvarenga MD

## 2019-05-17 NOTE — WOUND CARE
Patient evaluated for HBO therapy secondary to late effects of radiation, bloody urine for >1year. He is followed by Dr. Alice Constantino urology who referred him to this clinic. He was instructed in risks and benefits of HBO therapy and he verbalized understanding. After a tour of the HBO facility patient was discharged to go to [facility] today if possible to obtain HgA1C (he was given orders for the test). We will attempt to obtain insurance approval for the procedure and patient will be contacted as soon as additional info is available.

## 2019-06-17 ENCOUNTER — HOSPITAL ENCOUNTER (OUTPATIENT)
Dept: WOUND CARE | Age: 69
Discharge: HOME | End: 2019-06-17
Payer: MEDICARE

## 2019-06-17 VITALS
RESPIRATION RATE: 16 BRPM | HEART RATE: 64 BPM | SYSTOLIC BLOOD PRESSURE: 158 MMHG | TEMPERATURE: 97.4 F | DIASTOLIC BLOOD PRESSURE: 69 MMHG

## 2019-06-17 LAB
GLUCOSE BLD STRIP.AUTO-MCNC: 124 MG/DL (ref 65–100)
SERVICE CMNT-IMP: ABNORMAL

## 2019-06-17 PROCEDURE — G0277 HBOT, FULL BODY CHAMBER, 30M: HCPCS

## 2019-06-17 PROCEDURE — 82962 GLUCOSE BLOOD TEST: CPT

## 2019-06-18 ENCOUNTER — HOSPITAL ENCOUNTER (OUTPATIENT)
Dept: WOUND CARE | Age: 69
Discharge: HOME | End: 2019-06-18
Payer: MEDICARE

## 2019-06-18 VITALS
TEMPERATURE: 97.1 F | SYSTOLIC BLOOD PRESSURE: 144 MMHG | DIASTOLIC BLOOD PRESSURE: 74 MMHG | RESPIRATION RATE: 16 BRPM | HEART RATE: 55 BPM

## 2019-06-18 PROCEDURE — G0277 HBOT, FULL BODY CHAMBER, 30M: HCPCS

## 2019-06-19 ENCOUNTER — HOSPITAL ENCOUNTER (OUTPATIENT)
Dept: GENERAL RADIOLOGY | Age: 69
Discharge: HOME | End: 2019-06-19
Payer: MEDICARE

## 2019-06-19 ENCOUNTER — HOSPITAL ENCOUNTER (OUTPATIENT)
Dept: WOUND CARE | Age: 69
Discharge: HOME | End: 2019-06-19
Payer: MEDICARE

## 2019-06-19 DIAGNOSIS — M54.50 LOW BACK PAIN: ICD-10-CM

## 2019-06-19 PROBLEM — N30.40 RADIATION CYSTITIS: Status: ACTIVE | Noted: 2019-05-17

## 2019-06-19 PROCEDURE — G0277 HBOT, FULL BODY CHAMBER, 30M: HCPCS

## 2019-06-19 PROCEDURE — 72100 X-RAY EXAM L-S SPINE 2/3 VWS: CPT

## 2019-06-19 NOTE — PROGRESS NOTES
Hyperbaric Oxygen Therapy Treatment Note     DOS: 6/19/2019     The patient was referred by Dr. Jose Brandon, urologist, for hemorrhagic cystitis, which is due to late effects of radiation.  His primary care doctor is Dr. Cameron Espino. Evie Fajardo patient has a history of having prostate cancer originally in 2008 with a prostatectomy.  About 2 years ago, his PSA started increasing. Luic Maxwell then underwent 38 treatments of external beam radiation for 6840 josh between 08/21/2017 and 10/17/2017 over 52 days. Luci Maxwell started developing hemorrhagic cystitis with flaquito hematuria in 01/2019. Luci Maxwell underwent cystoscopy in February.     The patient has had continuous urinary bleeding. Luci Maxwell underwent another cystoscopy and cauterization in late 03/2019. Luci Maxwell was then seen in the emergency room for urinary retention and since that time, has still had episodes of bleeding along with passing of clots.     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  Upon presentation this morning, he feels well.  Vital Signs were satisfactory.        He underwent hyperbaric treatment 3 of 20, consisting of a 2.5 atmosphere dive for 90 minutes with 2 air breaks.  He tolerated the treatments well. Luci Maxwell had no symptoms or problems.  Upon completion of the hyperbaric oxygen treatment, he showed no evidence of complication.     The patient was discharged to home in satisfactory condition.  He tolerated today's treatment well.    I was the attending physician and was available  throughout today's treatment.     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

## 2019-06-19 NOTE — PROGRESS NOTES
Hyperbaric Oxygen Therapy Treatment Note     DOS: 6/18/2019     The patient was referred by Dr. Angela Davis, urologist, for hemorrhagic cystitis, which is due to late effects of radiation.  His primary care doctor is Dr. Tylor Gutierres. Marjan Valente patient has a history of having prostate cancer originally in 2008 with a prostatectomy.  About 2 years ago, his PSA started increasing. Carmenza Thomason then underwent 38 treatments of external beam radiation for 6840 josh between 08/21/2017 and 10/17/2017 over 52 days. Carmenza Thomason started developing hemorrhagic cystitis with flaquito hematuria in 01/2019. Carmenza Thomason underwent cystoscopy in February.     The patient has had continuous urinary bleeding. Carmenza Thomason underwent another cystoscopy and cauterization in late 03/2019. Carmenza Thomason was then seen in the emergency room for urinary retention and since that time, has still had episodes of bleeding along with passing of clots.     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  Upon presentation this morning, he feels well.  Vital Signs were satisfactory.        He underwent hyperbaric treatment 2 of 20, consisting of a 2.5 atmosphere dive for 90 minutes with 2 air breaks.  He tolerated the treatments well. Carmenza Thomason had no symptoms or problems.  Upon completion of the hyperbaric oxygen treatment, he showed no evidence of complication.     The patient was discharged to home in satisfactory condition.  He tolerated today's treatment well.    I was the attending physician and was available  throughout today's treatment.     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

## 2019-06-19 NOTE — PROGRESS NOTES
Hyperbaric Oxygen Therapy Treatment Note      The patient was referred by Dr. 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 His primary care doctor is Dr. Cristy Abraham. The patient has a history of having prostate cancer originally in 2008 with a prostatectomy. About 2 years ago, his PSA started increasing. He then underwent 38 treatments of external beam radiation for 6840 josh between 08/21/2017 and 10/17/2017 over 52 days. He started developing hemorrhagic cystitis with flaquito hematuria in 01/2019. He underwent cystoscopy in February.     The patient has had continuous urinary bleeding. He underwent another cystoscopy and cauterization in late 03/2019. He was then seen in the emergency room for urinary retention and since that time, has still had episodes of bleeding along with passing of clots. 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 Upon presentation this morning, he feels well. Vital Signs were satisfactory. Both tympanic membranes and middle ear spaces were clear prior to treatment. He underwent hyperbaric treatment 1 of 20, consisting of a 2.5 atmosphere dive for 90 minutes with 2 air breaks. He tolerated the treatments well. He had no symptoms or problems. Upon completion of the hyperbaric oxygen treatment, he showed no evidence of complication. The patient was discharged to home in satisfactory condition. He tolerated today's treatment well. I was the attending physician and was available  throughout today's treatment. 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 Denson Ganser, MD

## 2019-06-20 ENCOUNTER — HOSPITAL ENCOUNTER (OUTPATIENT)
Dept: WOUND CARE | Age: 69
Discharge: HOME | End: 2019-06-20
Payer: MEDICARE

## 2019-06-20 VITALS
SYSTOLIC BLOOD PRESSURE: 127 MMHG | RESPIRATION RATE: 16 BRPM | DIASTOLIC BLOOD PRESSURE: 72 MMHG | TEMPERATURE: 98 F | HEART RATE: 60 BPM

## 2019-06-20 PROCEDURE — G0277 HBOT, FULL BODY CHAMBER, 30M: HCPCS

## 2019-06-21 ENCOUNTER — HOSPITAL ENCOUNTER (OUTPATIENT)
Dept: WOUND CARE | Age: 69
Discharge: HOME | End: 2019-06-21
Payer: MEDICARE

## 2019-06-21 VITALS
RESPIRATION RATE: 16 BRPM | HEART RATE: 60 BPM | TEMPERATURE: 97.8 F | DIASTOLIC BLOOD PRESSURE: 71 MMHG | SYSTOLIC BLOOD PRESSURE: 164 MMHG

## 2019-06-21 VITALS
SYSTOLIC BLOOD PRESSURE: 164 MMHG | HEART RATE: 60 BPM | DIASTOLIC BLOOD PRESSURE: 71 MMHG | TEMPERATURE: 97.4 F | RESPIRATION RATE: 16 BRPM

## 2019-06-21 PROCEDURE — G0277 HBOT, FULL BODY CHAMBER, 30M: HCPCS

## 2019-06-21 NOTE — PROGRESS NOTES
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 Upon presentation this morning, he felt well. OBJECTIVE:  VITAL SIGNS:  His temperature was 97.8, pulse 55, respirations 16, blood pressure 127/60. HEENT:  Both tympanic membranes and middle ear spaces were clear. The patient underwent a 2.5 atmosphere dive for 90 minutes with 2 air breaks without any symptoms, problems, or complications. Followup examination showed that he remained afebrile, pulse was 60, respirations 16, blood pressure 164/71. I was the attending physician and was present throughout today's treatment. The patient was discharged to home in satisfactory condition. He will return on Monday,  for treatment #6.       MD ESTEPHANIE [email or web address]  D:  2019 13:13  T:  2019 13:22  JOB #:  [id number]

## 2019-06-24 ENCOUNTER — HOSPITAL ENCOUNTER (OUTPATIENT)
Dept: WOUND CARE | Age: 69
Discharge: HOME | End: 2019-06-24
Payer: MEDICARE

## 2019-06-24 VITALS
DIASTOLIC BLOOD PRESSURE: 71 MMHG | TEMPERATURE: 98.3 F | RESPIRATION RATE: 16 BRPM | HEART RATE: 62 BPM | SYSTOLIC BLOOD PRESSURE: 170 MMHG

## 2019-06-24 PROCEDURE — G0277 HBOT, FULL BODY CHAMBER, 30M: HCPCS

## 2019-06-25 ENCOUNTER — HOSPITAL ENCOUNTER (OUTPATIENT)
Dept: WOUND CARE | Age: 69
Discharge: HOME | End: 2019-06-25
Payer: MEDICARE

## 2019-06-25 VITALS
TEMPERATURE: 97.3 F | HEART RATE: 52 BPM | SYSTOLIC BLOOD PRESSURE: 144 MMHG | RESPIRATION RATE: 16 BRPM | DIASTOLIC BLOOD PRESSURE: 74 MMHG

## 2019-06-25 PROCEDURE — G0277 HBOT, FULL BODY CHAMBER, 30M: HCPCS

## 2019-06-25 NOTE — PROGRESS NOTES
Hyperbaric Oxygen Therapy Treatment Note     DOS: 6/25/2019     The patient was referred by Dr. 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  His primary care doctor is Dr. Zbigniew Monzon. Jose Olvera patient has a history of having prostate cancer originally in 2008 with a prostatectomy.  About 2 years ago, his PSA started increasing. [facility]ia Acevedo then underwent 38 treatments of external beam radiation for 6840 josh between 08/21/2017 and 10/17/2017 over 52 days. Mariia Acevedo started developing hemorrhagic cystitis with flaquito hematuria in 01/2019. Mariia Acevedo underwent cystoscopy in February.     The patient has had continuous urinary bleeding. Mariia Acevedo underwent another cystoscopy and cauterization in late 03/2019. Mariia Acevedo was then seen in the emergency room for urinary retention and since that time, has still had episodes of bleeding along with passing of clots.     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  Upon presentation this morning, he feels well.  Vital Signs were satisfactory.        He underwent hyperbaric treatment 7 of 20, consisting of a 2.5 atmosphere dive for 90 minutes with 2 air breaks.  He tolerated the treatments well. Mariia Acevedo had no symptoms or problems.  Upon completion of the hyperbaric oxygen treatment, he showed no evidence of complication.     The patient was discharged to home in satisfactory condition.  He tolerated today's treatment well.    I was the attending physician and was available  throughout today's treatment.     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

## 2019-06-26 ENCOUNTER — HOSPITAL ENCOUNTER (OUTPATIENT)
Dept: WOUND CARE | Age: 69
Discharge: HOME | End: 2019-06-26
Payer: MEDICARE

## 2019-06-26 VITALS
TEMPERATURE: 96.9 F | RESPIRATION RATE: 16 BRPM | DIASTOLIC BLOOD PRESSURE: 76 MMHG | HEART RATE: 58 BPM | SYSTOLIC BLOOD PRESSURE: 149 MMHG

## 2019-06-26 PROCEDURE — G0277 HBOT, FULL BODY CHAMBER, 30M: HCPCS

## 2019-06-26 NOTE — PROGRESS NOTES
Hyperbaric Oxygen Therapy Treatment Note        The patient was referred by Dr. 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  His primary care doctor is Dr. Cira Tiwari. Astrid Damon patient has a history of having prostate cancer originally in 2008 with a prostatectomy.  About 2 years ago, his PSA started increasing. [facility] then underwent 38 treatments of external beam radiation for 6840 josh between 08/21/2017 and 10/17/2017 over 52 days. [facility] started developing hemorrhagic cystitis with flaquito hematuria in 01/2019. [facility] underwent cystoscopy in February.     The patient has had continuous urinary bleeding. [facility] underwent another cystoscopy and cauterization in late 03/2019. [facility] was then seen in the emergency room for urinary retention and since that time, has still had episodes of bleeding along with passing of clots.     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  Upon presentation this morning, he feels well.  Vital Signs were satisfactory.  Both tympanic membranes and middle ear spaces were clear prior to treatment.       He underwent hyperbaric treatment 4 of 20, consisting of a 2.5 atmosphere dive for 90 minutes with 2 air breaks.  He tolerated the treatments well. [facility] had no symptoms or problems.  Upon completion of the hyperbaric oxygen treatment, he showed no evidence of complication.     The patient was discharged to home in satisfactory condition.  He tolerated today's treatment well.    I was the attending physician and was available  throughout today's treatment.           Dx:  Radiation cystitis   N30.40     SUDHEER Ruby MD

## 2019-06-27 ENCOUNTER — HOSPITAL ENCOUNTER (OUTPATIENT)
Dept: WOUND CARE | Age: 69
Discharge: HOME | End: 2019-06-27
Payer: MEDICARE

## 2019-06-27 VITALS
RESPIRATION RATE: 16 BRPM | HEART RATE: 59 BPM | DIASTOLIC BLOOD PRESSURE: 66 MMHG | TEMPERATURE: 97 F | SYSTOLIC BLOOD PRESSURE: 127 MMHG

## 2019-06-27 PROCEDURE — G0277 HBOT, FULL BODY CHAMBER, 30M: HCPCS

## 2019-06-27 NOTE — PROGRESS NOTES
Hyperbaric Oxygen Therapy Treatment Note        The patient was referred by Dr. 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  His primary care doctor is Dr. Dat Wilson. Darian Harrington patient has a history of having prostate cancer originally in 2008 with a prostatectomy.  About 2 years ago, his PSA started increasing. Charanjit Tom then underwent 38 treatments of external beam radiation for 6840 josh between 08/21/2017 and 10/17/2017 over 52 days. [facility]anj[facility] Tom started developing hemorrhagic cystitis with flaquito hematuria in 01/2019. Charanjit Tom underwent cystoscopy in February.     The patient has had continuous urinary bleeding. Charanjit Tom underwent another cystoscopy and cauterization in late 03/2019. Charanjit Tom was then seen in the emergency room for urinary retention and since that time, has still had episodes of bleeding along with passing of clots.     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  Upon presentation this morning, he feels well.  Vital Signs were satisfactory.  Both tympanic membranes and middle ear spaces were clear prior to treatment.       He underwent hyperbaric treatment 8 of 20, consisting of a 2.5 atmosphere dive for 90 minutes with 2 air breaks.  He tolerated the treatments well. Charanjit Tom had no symptoms or problems.  Upon completion of the hyperbaric oxygen treatment, he showed no evidence of complication.     The patient was discharged to home in satisfactory condition.  He tolerated today's treatment well.    I was the attending physician and was available  throughout today's treatment.           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

## 2019-06-27 NOTE — PROGRESS NOTES
Hyperbaric Oxygen Therapy Treatment Note        The patient was referred by Dr. 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  His primary care doctor is Dr. Marzetta Sever. Faiza Harris patient has a history of having prostate cancer originally in 2008 with a prostatectomy.  About 2 years ago, his PSA started increasing. [facility] then underwent 38 treatments of external beam radiation for 6840 josh between 08/21/2017 and 10/17/2017 over 52 days. [facility] started developing hemorrhagic cystitis with flaquito hematuria in 01/2019. [facility] underwent cystoscopy in February.     The patient has had continuous urinary bleeding. [facility] underwent another cystoscopy and cauterization in late 03/2019. [facility] was then seen in the emergency room for urinary retention and since that time, has still had episodes of bleeding along with passing of clots.     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  Upon presentation this morning, he feels well.  Vital Signs were satisfactory.  Both tympanic membranes and middle ear spaces were clear prior to treatment.       He underwent hyperbaric treatment 6 of 20, consisting of a 2.5 atmosphere dive for 90 minutes with 2 air breaks.  He tolerated the treatments well. [facility] had no symptoms or problems.  Upon completion of the hyperbaric oxygen treatment, he showed no evidence of complication.     The patient was discharged to home in satisfactory condition.  He tolerated today's treatment well.    I was the attending physician and was available  throughout today's treatment.           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

## 2019-06-28 ENCOUNTER — HOSPITAL ENCOUNTER (OUTPATIENT)
Dept: WOUND CARE | Age: 69
Discharge: HOME | End: 2019-06-28
Payer: MEDICARE

## 2019-06-28 VITALS
SYSTOLIC BLOOD PRESSURE: 135 MMHG | HEART RATE: 63 BPM | DIASTOLIC BLOOD PRESSURE: 75 MMHG | TEMPERATURE: 97.8 F | RESPIRATION RATE: 16 BRPM

## 2019-06-28 PROCEDURE — G0277 HBOT, FULL BODY CHAMBER, 30M: HCPCS

## 2019-06-28 NOTE — PROGRESS NOTES
[address] [facility]  WOUND CARE PROGRESS NOTE    Name:  Cristian Shetty  MR#:  [id number]  :  1950  ACCOUNT #:  [de-identified]  DATE OF SERVICE:  2019      HYPERBARIC OXYGEN TREATMENT NOTE    SUBJECTIVE:  The patient presents today for treatment #10 of a planned 30 treatments of hyperbaric oxygen for hemorrhagic cystitis as a consequence of late effects of radiation. When discussing with the patient his status, he has noticed some spotting at times, but much less than he had previously, so he is quite pleased with the progress he has been making. We discussed how heavy lifting, bending, or straining could increase his intra-abdominal pressure, causing a scab or clot in the bladder to break loose, leading to bleeding. Therefore, he will take care to minimize such activities for a few weeks. OBJECTIVE:  VITAL SIGNS:  His temperature this morning is 97.8, pulse 63, respirations 16, blood pressure 154/71. Both tympanic membranes and middle ear spaces were clear. The patient underwent a 2.5 atmosphere dive for 90 minutes with 2 air breaks without any symptoms, problems, or complications. Followup examination showed his vital signs remained stable. The patient was discharged to home in satisfactory condition. He will return on Monday,  for treatment #11. I was the attending physician and was present throughout today's treatment.       MD ESTEPHANIE [email or web address]  D:  2019 13:16  T:  2019 13:26  JOB #:  [id number]

## 2019-07-01 ENCOUNTER — HOSPITAL ENCOUNTER (OUTPATIENT)
Dept: WOUND CARE | Age: 69
Discharge: HOME | End: 2019-07-01
Payer: MEDICARE

## 2019-07-01 VITALS — SYSTOLIC BLOOD PRESSURE: 147 MMHG | RESPIRATION RATE: 16 BRPM | HEART RATE: 58 BPM | DIASTOLIC BLOOD PRESSURE: 51 MMHG

## 2019-07-01 PROCEDURE — G0277 HBOT, FULL BODY CHAMBER, 30M: HCPCS

## 2019-07-03 ENCOUNTER — HOSPITAL ENCOUNTER (OUTPATIENT)
Dept: WOUND CARE | Age: 69
Discharge: HOME | End: 2019-07-03
Payer: MEDICARE

## 2019-07-03 VITALS
TEMPERATURE: 97.1 F | DIASTOLIC BLOOD PRESSURE: 64 MMHG | SYSTOLIC BLOOD PRESSURE: 154 MMHG | RESPIRATION RATE: 16 BRPM | HEART RATE: 66 BPM

## 2019-07-03 PROCEDURE — G0277 HBOT, FULL BODY CHAMBER, 30M: HCPCS

## 2019-07-03 NOTE — PROGRESS NOTES
Hyperbaric Oxygen Therapy Treatment Note     DOS: 7/3/2019     The patient was referred by Dr. 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  His primary care doctor is Dr. Pepe Avila. [address] patient has a history of having prostate cancer originally in 2008 with a prostatectomy.  About 2 years ago, his PSA started increasing. [facility] then underwent 38 treatments of external beam radiation for 6840 josh between 08/21/2017 and 10/17/2017 over 52 days. [facility] started developing hemorrhagic cystitis with flaquito hematuria in 01/2019. [facility] underwent cystoscopy in February.     The patient has had continuous urinary bleeding. [facility] underwent another cystoscopy and cauterization in late 03/2019. [facility] was then seen in the emergency room for urinary retention and since that time, has still had episodes of bleeding along with passing of clots.     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  Upon presentation this morning, he feels well.  Vital Signs were satisfactory.        He underwent hyperbaric treatment 12 of 20, consisting of a 2.5 atmosphere dive for 90 minutes with 2 air breaks.  He tolerated the treatments well. [facility] had no symptoms or problems.  Upon completion of the hyperbaric oxygen treatment, he showed no evidence of complication.     The patient was discharged to home in satisfactory condition.  He tolerated today's treatment well.    I was the attending physician and was available  throughout today's treatment.     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

## 2019-07-05 ENCOUNTER — HOSPITAL ENCOUNTER (OUTPATIENT)
Dept: WOUND CARE | Age: 69
Discharge: HOME | End: 2019-07-05
Payer: MEDICARE

## 2019-07-05 VITALS
HEART RATE: 66 BPM | DIASTOLIC BLOOD PRESSURE: 64 MMHG | RESPIRATION RATE: 16 BRPM | TEMPERATURE: 98.2 F | SYSTOLIC BLOOD PRESSURE: 168 MMHG

## 2019-07-05 PROCEDURE — G0277 HBOT, FULL BODY CHAMBER, 30M: HCPCS

## 2019-07-05 NOTE — PROGRESS NOTES
Hyperbaric Oxygen Therapy Treatment Note        The patient was referred by Dr. 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  His primary care doctor is Dr. Loren Ch. [address] [facility] [address] [facility] patient has a history of having prostate cancer originally in 2008 with a prostatectomy.  About 2 years ago, his PSA started increasing. [facility] then underwent 38 treatments of external beam radiation for 6840 josh between 08/21/2017 and 10/17/2017 over 52 days. [facility] started developing hemorrhagic cystitis with flaquito hematuria in 01/2019. [facility] underwent cystoscopy in February.     The patient has had continuous urinary bleeding. [facility] underwent another cystoscopy and cauterization in late 03/2019. [facility] was then seen in the emergency room for urinary retention and since that time, has still had episodes of bleeding along with passing of clots.     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  Upon presentation this morning, he feels well.  Vital Signs were satisfactory.  Both tympanic membranes and middle ear spaces were clear prior to treatment.       He underwent hyperbaric [id number] of 20, consisting of a 2.5 atmosphere dive for 90 minutes with 2 air breaks.  He tolerated the treatments well. [facility] had no symptoms or problems.  Upon completion of the hyperbaric oxygen treatment, he showed no evidence of complication.     The patient was discharged to home in satisfactory condition.  He tolerated today's treatment well.    I was the attending physician and was available  throughout today's treatment.           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

## 2019-07-05 NOTE — PROGRESS NOTES
[address]  WOUND CARE PROGRESS NOTE    Name:  Harlow Dakins  MR#:  [id number]  :  1950  ACCOUNT #:  [de-identified]  DATE OF SERVICE:  2019    HYPERBARIC OXYGEN TREATMENT NOTE. The patient presents for treatment #13 of a planned 20 treatments of hyperbaric oxygen for hemorrhagic cystitis as a consequence of late effects of radiation. Upon questioning, the patient did have significant bleeding yesterday with him passing clots in his urine after having undergone 12 treatments. This morning, he feels well. His temperature is 98.2, pulse 66, respirations 16, blood pressure 140/75. Both tympanic membranes and middle ear spaces were clear. The patient underwent a 2.5 atmosphere dive for 90 minutes with two air breaks without any symptoms, problems or complications. Followup showed he remained afebrile. His vital signs remained stable and he was discharged to home in satisfactory condition. I explained to the patient that we initially got approval for 20 treatments of hyperbaric oxygen and we are going to try to get another 10 due to his persistent urinary tract bleeding. That will get him up to a level of 30 treatments because of the persistent bleeding and passing of clots in his urine, which is the usual minimum number of treatments for such a disorder. He understands that occasionally we even extend the number of treatments to 40 if bleeding persists. He understands and is in agreement. The patient was discharged to home in satisfactory condition. I was the attending physician and was present throughout today's treatment.       Magdalena Benitez MD      [email or web address]  D:  2019 11:42  T:  2019 11:52  JOB #:  [id number]

## 2019-07-08 ENCOUNTER — HOSPITAL ENCOUNTER (OUTPATIENT)
Dept: WOUND CARE | Age: 69
Discharge: HOME | End: 2019-07-08
Payer: MEDICARE

## 2019-07-08 VITALS
RESPIRATION RATE: 14 BRPM | HEART RATE: 66 BPM | TEMPERATURE: 98.1 F | DIASTOLIC BLOOD PRESSURE: 81 MMHG | SYSTOLIC BLOOD PRESSURE: 141 MMHG

## 2019-07-08 LAB
GLUCOSE BLD STRIP.AUTO-MCNC: 210 MG/DL (ref 65–100)
SERVICE CMNT-IMP: ABNORMAL

## 2019-07-08 PROCEDURE — G0277 HBOT, FULL BODY CHAMBER, 30M: HCPCS

## 2019-07-08 PROCEDURE — 82962 GLUCOSE BLOOD TEST: CPT

## 2019-07-08 NOTE — PROGRESS NOTES
Hyperbaric Oxygen Therapy Treatment Note        The patient was referred by Dr. 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  His primary care doctor is Dr. Kesha Means. Rosita Higgins patient has a history of having prostate cancer originally in 2008 with a prostatectomy.  About 2 years ago, his PSA started increasing. [facility] then underwent 38 treatments of external beam radiation for 6840 josh between 08/21/2017 and 10/17/2017 over 52 days. [facility] started developing hemorrhagic cystitis with flaquito hematuria in 01/2019. [facility] underwent cystoscopy in February.     The patient has had continuous urinary bleeding. [facility] underwent another cystoscopy and cauterization in late 03/2019. [facility] was then seen in the emergency room for urinary retention and since that time, has still had episodes of bleeding along with passing of clots.     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  Upon presentation this morning, he feels well.  Vital Signs were satisfactory.  Both tympanic membranes and middle ear spaces were clear prior to treatment.       He underwent hyperbaric treatment 14 of 20, consisting of a 2.5 atmosphere dive for 90 minutes with 2 air breaks.  He tolerated the treatments well. [facility] had no symptoms or problems.  Upon completion of the hyperbaric oxygen treatment, he showed no evidence of complication.     The patient was discharged to home in satisfactory condition.  He tolerated today's treatment well.    I was the attending physician and was available  throughout today's treatment.           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

## 2019-07-10 ENCOUNTER — HOSPITAL ENCOUNTER (OUTPATIENT)
Dept: WOUND CARE | Age: 69
Discharge: HOME | End: 2019-07-10
Payer: MEDICARE

## 2019-07-10 VITALS
RESPIRATION RATE: 18 BRPM | TEMPERATURE: 97.7 F | SYSTOLIC BLOOD PRESSURE: 169 MMHG | DIASTOLIC BLOOD PRESSURE: 77 MMHG | HEART RATE: 56 BPM

## 2019-07-10 PROCEDURE — G0277 HBOT, FULL BODY CHAMBER, 30M: HCPCS

## 2019-07-10 NOTE — PROGRESS NOTES
Hyperbaric Oxygen Therapy Treatment Note     DOS: 7/10/2019     The patient was referred by Dr. 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  His primary care doctor is Dr. Mirna Montano. Galen Brewster patient has a history of having prostate cancer originally in 2008 with a prostatectomy.  About 2 years ago, his PSA started increasing. [facility] then underwent 38 treatments of external beam radiation for 6840 josh between 08/21/2017 and 10/17/2017 over 52 days. [facility] started developing hemorrhagic cystitis with flaquito hematuria in 01/2019. [facility] underwent cystoscopy in February.     The patient has had continuous urinary bleeding. [facility] underwent another cystoscopy and cauterization in late 03/2019. [facility] was then seen in the emergency room for urinary retention and since that time, has still had episodes of bleeding along with passing of clots.     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  Upon presentation this morning, he feels well.  Vital Signs were satisfactory.        He underwent hyperbaric treatment 15 of 20, consisting of a 2.5 atmosphere dive for 90 minutes with 2 air breaks.  He tolerated the treatments well. [facility] had no symptoms or problems.  Upon completion of the hyperbaric oxygen treatment, he showed no evidence of complication.     The patient was discharged to home in satisfactory condition.  He tolerated today's treatment well.    I was the attending physician and was available  throughout today's treatment.     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

## 2019-07-11 ENCOUNTER — HOSPITAL ENCOUNTER (OUTPATIENT)
Dept: WOUND CARE | Age: 69
Discharge: HOME | End: 2019-07-11
Payer: MEDICARE

## 2019-07-11 VITALS
DIASTOLIC BLOOD PRESSURE: 76 MMHG | TEMPERATURE: 97.7 F | HEART RATE: 58 BPM | RESPIRATION RATE: 16 BRPM | SYSTOLIC BLOOD PRESSURE: 168 MMHG

## 2019-07-11 PROCEDURE — G0277 HBOT, FULL BODY CHAMBER, 30M: HCPCS

## 2019-07-12 ENCOUNTER — HOSPITAL ENCOUNTER (OUTPATIENT)
Dept: WOUND CARE | Age: 69
Discharge: HOME | End: 2019-07-12
Payer: MEDICARE

## 2019-07-12 VITALS
SYSTOLIC BLOOD PRESSURE: 178 MMHG | TEMPERATURE: 98.1 F | DIASTOLIC BLOOD PRESSURE: 72 MMHG | HEART RATE: 60 BPM | RESPIRATION RATE: 16 BRPM

## 2019-07-12 PROCEDURE — G0277 HBOT, FULL BODY CHAMBER, 30M: HCPCS

## 2019-07-12 NOTE — PROGRESS NOTES
[address]  WOUND CARE PROGRESS NOTE    Name:  Margaret Winchester  MR#:  [id number]  :  1950  ACCOUNT #:  [de-identified]  DATE OF SERVICE:  2019    HYPERBARIC OXYGEN TREATMENT NOTE    The patient presents today for treatment #17 of a planned 30 treatments of hyperbaric oxygen for hemorrhagic cystitis as a consequence of late effects of radiation. Upon questioning, he is doing quite well. He has had no further bleeding over the past week and certainly notices significant improvement in his symptoms. His temperature upon presentation was 98.1, pulse 62, respirations 16, blood pressure 136/78. Both tympanic membranes and middle ear spaces were clear. The patient underwent a 2.5 atmosphere dive for 90 minutes with 2 air breaks without any symptoms, problems or complications. Followup examination showed his vital signs remaining stable with heart rate 60, respiratory rate 16 and blood pressure 178/72. The patient was discharged to home in satisfactory condition. I was the attending physician and was present throughout today's treatment. The patient will return on Monday, 07/15/2019, for treatment #18.         MD ESTEPHANIE Mott/[email or web address]  D:  2019 12:10  T:  2019 12:19  JOB #:  [id number]

## 2019-07-15 ENCOUNTER — HOSPITAL ENCOUNTER (OUTPATIENT)
Dept: WOUND CARE | Age: 69
Discharge: HOME | End: 2019-07-15
Payer: MEDICARE

## 2019-07-15 VITALS
TEMPERATURE: 97.7 F | HEART RATE: 58 BPM | RESPIRATION RATE: 16 BRPM | DIASTOLIC BLOOD PRESSURE: 76 MMHG | SYSTOLIC BLOOD PRESSURE: 158 MMHG

## 2019-07-15 PROCEDURE — G0277 HBOT, FULL BODY CHAMBER, 30M: HCPCS

## 2019-07-15 NOTE — PROGRESS NOTES
Hyperbaric Oxygen Therapy Treatment Note        The patient was referred by Dr. 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  His primary care doctor is Dr. Frankey Fleischer. Tristin Chow patient has a history of having prostate cancer originally in 2008 with a prostatectomy.  About 2 years ago, his PSA started increasing. [facility] then underwent 38 treatments of external beam radiation for 6840 josh between 08/21/2017 and 10/17/2017 over 52 days. [facility] started developing hemorrhagic cystitis with flaquito hematuria in 01/2019. [facility] underwent cystoscopy in February.     The patient has had continuous urinary bleeding. [facility] underwent another cystoscopy and cauterization in late 03/2019. [facility] was then seen in the emergency room for urinary retention and since that time, has still had episodes of bleeding along with passing of clots.     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  Upon presentation this morning, he feels well.  Vital Signs were satisfactory.  Both tympanic membranes and middle ear spaces were clear prior to treatment.       He underwent hyperbaric treatment 16 of 20, consisting of a 2.5 atmosphere dive for 90 minutes with 2 air breaks.  He tolerated the treatments well. [facility] had no symptoms or problems.  Upon completion of the hyperbaric oxygen treatment, he showed no evidence of complication.     The patient was discharged to home in satisfactory condition.  He tolerated today's treatment well.    I was the attending physician and was available  throughout today's treatment.           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

## 2019-07-17 ENCOUNTER — HOSPITAL ENCOUNTER (OUTPATIENT)
Dept: WOUND CARE | Age: 69
Discharge: HOME | End: 2019-07-17
Payer: MEDICARE

## 2019-07-17 VITALS — RESPIRATION RATE: 16 BRPM | SYSTOLIC BLOOD PRESSURE: 122 MMHG | HEART RATE: 64 BPM | DIASTOLIC BLOOD PRESSURE: 60 MMHG

## 2019-07-17 PROCEDURE — G0277 HBOT, FULL BODY CHAMBER, 30M: HCPCS

## 2019-07-17 NOTE — PROGRESS NOTES
Hyperbaric Oxygen Therapy Treatment Note     DOS: 7/17/2019     The patient was referred by Dr. 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  His primary care doctor is Dr. Haley Jacobo. Myra Peterson patient has a history of having prostate cancer originally in 2008 with a prostatectomy.  About 2 years ago, his PSA started increasing. [facility] then underwent 38 treatments of external beam radiation for 6840 josh between 08/21/2017 and 10/17/2017 over 52 days. [facility] started developing hemorrhagic cystitis with flaquito hematuria in 01/2019. [facility] underwent cystoscopy in February.     The patient has had continuous urinary bleeding. [facility] underwent another cystoscopy and cauterization in late 03/2019. [facility] was then seen in the emergency room for urinary retention and since that time, has still had episodes of bleeding along with passing of clots.     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  Upon presentation this morning, he feels well.  Vital Signs were satisfactory.        He underwent hyperbaric treatment 19 of 30, consisting of a 2.5 atmosphere dive for 90 minutes with 2 air breaks.  He tolerated the treatments well. [facility] had no symptoms or problems.  Upon completion of the hyperbaric oxygen treatment, he showed no evidence of complication.     The patient was discharged to home in satisfactory condition.  He tolerated today's treatment well.    I was the attending physician and was available  throughout today's treatment.     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

## 2019-07-17 NOTE — PROGRESS NOTES
Hyperbaric Oxygen Therapy Treatment Note        The patient was referred by Dr. 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  His primary care doctor is Dr. Jolie Wynn. Eloina Navarro patient has a history of having prostate cancer originally in 2008 with a prostatectomy.  About 2 years ago, his PSA started increasing. Nena Nino then underwent 38 treatments of external beam radiation for 6840 josh between 08/21/2017 and 10/17/2017 over 52 days. Nena Nino started developing hemorrhagic cystitis with flaquito hematuria in 01/2019. Nena Nino underwent cystoscopy in February.     The patient has had continuous urinary bleeding. Nena Nino underwent another cystoscopy and cauterization in late 03/2019. Nena Nino was then seen in the emergency room for urinary retention and since that time, has still had episodes of bleeding along with passing of clots.     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  Upon presentation this morning, he feels well.  Vital Signs were satisfactory.  Both tympanic membranes and middle ear spaces were clear prior to treatment.       He underwent hyperbaric treatment 18 of 20, consisting of a 2.5 atmosphere dive for 90 minutes with 2 air breaks.  He tolerated the treatments well. Nena Nino had no symptoms or problems.  Upon completion of the hyperbaric oxygen treatment, he showed no evidence of complication.     The patient was discharged to home in satisfactory condition.  He tolerated today's treatment well.    I was the attending physician and was available  throughout today's treatment.           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

## 2021-01-12 NOTE — PROGRESS NOTES

## 2021-06-11 ENCOUNTER — HOSPITAL ENCOUNTER (OUTPATIENT)
Age: 71
Discharge: HOME | End: 2021-06-11
Attending: STUDENT IN AN ORGANIZED HEALTH CARE EDUCATION/TRAINING PROGRAM | Admitting: STUDENT IN AN ORGANIZED HEALTH CARE EDUCATION/TRAINING PROGRAM
Payer: MEDICARE

## 2021-06-11 VITALS
SYSTOLIC BLOOD PRESSURE: 148 MMHG | OXYGEN SATURATION: 98 % | DIASTOLIC BLOOD PRESSURE: 55 MMHG | TEMPERATURE: 98.2 F | HEART RATE: 52 BPM | HEIGHT: 68 IN | WEIGHT: 184 LBS | RESPIRATION RATE: 14 BRPM | BODY MASS INDEX: 27.89 KG/M2

## 2021-06-11 DIAGNOSIS — R07.9 CHEST PAIN, UNSPECIFIED TYPE: ICD-10-CM

## 2021-06-11 LAB
ANION GAP SERPL CALC-SCNC: 3 MMOL/L (ref 5–15)
BASOPHILS # BLD: 0.1 K/UL (ref 0–0.1)
BASOPHILS NFR BLD: 1 % (ref 0–1)
BUN SERPL-MCNC: 14 MG/DL (ref 6–20)
BUN/CREAT SERPL: 13 (ref 12–20)
CALCIUM SERPL-MCNC: 8.8 MG/DL (ref 8.5–10.1)
CHLORIDE SERPL-SCNC: 107 MMOL/L (ref 97–108)
CO2 SERPL-SCNC: 27 MMOL/L (ref 21–32)
CREAT SERPL-MCNC: 1.08 MG/DL (ref 0.7–1.3)
DIFFERENTIAL METHOD BLD: NORMAL
EOSINOPHIL # BLD: 0.2 K/UL (ref 0–0.4)
EOSINOPHIL NFR BLD: 3 % (ref 0–7)
ERYTHROCYTE [DISTWIDTH] IN BLOOD BY AUTOMATED COUNT: 14.4 % (ref 11.5–14.5)
GLUCOSE SERPL-MCNC: 99 MG/DL (ref 65–100)
HCT VFR BLD AUTO: 37.9 % (ref 36.6–50.3)
HGB BLD-MCNC: 12.3 G/DL (ref 12.1–17)
IMM GRANULOCYTES # BLD AUTO: 0 K/UL (ref 0–0.04)
IMM GRANULOCYTES NFR BLD AUTO: 0 % (ref 0–0.5)
LYMPHOCYTES # BLD: 1.9 K/UL (ref 0.8–3.5)
LYMPHOCYTES NFR BLD: 26 % (ref 12–49)
MCH RBC QN AUTO: 26.9 PG (ref 26–34)
MCHC RBC AUTO-ENTMCNC: 32.5 G/DL (ref 30–36.5)
MCV RBC AUTO: 82.8 FL (ref 80–99)
MONOCYTES # BLD: 0.7 K/UL (ref 0–1)
MONOCYTES NFR BLD: 9 % (ref 5–13)
NEUTS SEG # BLD: 4.4 K/UL (ref 1.8–8)
NEUTS SEG NFR BLD: 61 % (ref 32–75)
NRBC # BLD: 0 K/UL (ref 0–0.01)
NRBC BLD-RTO: 0 PER 100 WBC
PLATELET # BLD AUTO: 192 K/UL (ref 150–400)
PMV BLD AUTO: 12.3 FL (ref 8.9–12.9)
POTASSIUM SERPL-SCNC: 3.9 MMOL/L (ref 3.5–5.1)
RBC # BLD AUTO: 4.58 M/UL (ref 4.1–5.7)
SODIUM SERPL-SCNC: 137 MMOL/L (ref 136–145)
WBC # BLD AUTO: 7.3 K/UL (ref 4.1–11.1)

## 2021-06-11 PROCEDURE — 74011000250 HC RX REV CODE- 250: Performed by: STUDENT IN AN ORGANIZED HEALTH CARE EDUCATION/TRAINING PROGRAM

## 2021-06-11 PROCEDURE — C1893 INTRO/SHEATH, FIXED,NON-PEEL: HCPCS | Performed by: STUDENT IN AN ORGANIZED HEALTH CARE EDUCATION/TRAINING PROGRAM

## 2021-06-11 PROCEDURE — 77030028837 HC SYR ANGI PWR INJ COEU -A: Performed by: STUDENT IN AN ORGANIZED HEALTH CARE EDUCATION/TRAINING PROGRAM

## 2021-06-11 PROCEDURE — 80048 BASIC METABOLIC PNL TOTAL CA: CPT

## 2021-06-11 PROCEDURE — 74011000636 HC RX REV CODE- 636: Performed by: STUDENT IN AN ORGANIZED HEALTH CARE EDUCATION/TRAINING PROGRAM

## 2021-06-11 PROCEDURE — 77030018729 HC ELECTRD DEFIB PAD CARD -B: Performed by: STUDENT IN AN ORGANIZED HEALTH CARE EDUCATION/TRAINING PROGRAM

## 2021-06-11 PROCEDURE — 77030019569 HC BND COMPR RAD TERU -B: Performed by: STUDENT IN AN ORGANIZED HEALTH CARE EDUCATION/TRAINING PROGRAM

## 2021-06-11 PROCEDURE — 77030008543 HC TBNG MON PRSS MRTM -A: Performed by: STUDENT IN AN ORGANIZED HEALTH CARE EDUCATION/TRAINING PROGRAM

## 2021-06-11 PROCEDURE — 93458 L HRT ARTERY/VENTRICLE ANGIO: CPT | Performed by: STUDENT IN AN ORGANIZED HEALTH CARE EDUCATION/TRAINING PROGRAM

## 2021-06-11 PROCEDURE — 77030004549 HC CATH ANGI DX PRF MRTM -A: Performed by: STUDENT IN AN ORGANIZED HEALTH CARE EDUCATION/TRAINING PROGRAM

## 2021-06-11 PROCEDURE — 85025 COMPLETE CBC W/AUTO DIFF WBC: CPT

## 2021-06-11 PROCEDURE — 36415 COLL VENOUS BLD VENIPUNCTURE: CPT

## 2021-06-11 PROCEDURE — C1769 GUIDE WIRE: HCPCS | Performed by: STUDENT IN AN ORGANIZED HEALTH CARE EDUCATION/TRAINING PROGRAM

## 2021-06-11 PROCEDURE — 77030015766: Performed by: STUDENT IN AN ORGANIZED HEALTH CARE EDUCATION/TRAINING PROGRAM

## 2021-06-11 PROCEDURE — 77030010221 HC SPLNT WR POS TELE -B: Performed by: STUDENT IN AN ORGANIZED HEALTH CARE EDUCATION/TRAINING PROGRAM

## 2021-06-11 PROCEDURE — 77030019698 HC SYR ANGI MDLON MRTM -A: Performed by: STUDENT IN AN ORGANIZED HEALTH CARE EDUCATION/TRAINING PROGRAM

## 2021-06-11 PROCEDURE — C1876 STENT, NON-COA/NON-COV W/DEL: HCPCS | Performed by: STUDENT IN AN ORGANIZED HEALTH CARE EDUCATION/TRAINING PROGRAM

## 2021-06-11 PROCEDURE — 74011250636 HC RX REV CODE- 250/636

## 2021-06-11 PROCEDURE — 99152 MOD SED SAME PHYS/QHP 5/>YRS: CPT | Performed by: STUDENT IN AN ORGANIZED HEALTH CARE EDUCATION/TRAINING PROGRAM

## 2021-06-11 PROCEDURE — 74011250637 HC RX REV CODE- 250/637: Performed by: STUDENT IN AN ORGANIZED HEALTH CARE EDUCATION/TRAINING PROGRAM

## 2021-06-11 PROCEDURE — 74011250636 HC RX REV CODE- 250/636: Performed by: STUDENT IN AN ORGANIZED HEALTH CARE EDUCATION/TRAINING PROGRAM

## 2021-06-11 PROCEDURE — 99153 MOD SED SAME PHYS/QHP EA: CPT | Performed by: STUDENT IN AN ORGANIZED HEALTH CARE EDUCATION/TRAINING PROGRAM

## 2021-06-11 RX ORDER — VERAPAMIL HYDROCHLORIDE 2.5 MG/ML
INJECTION, SOLUTION INTRAVENOUS AS NEEDED
Status: DISCONTINUED | OUTPATIENT
Start: 2021-06-11 | End: 2021-06-11 | Stop reason: HOSPADM

## 2021-06-11 RX ORDER — NITROGLYCERIN 0.3 MG/1
TABLET SUBLINGUAL
COMMUNITY

## 2021-06-11 RX ORDER — HEPARIN SODIUM 1000 [USP'U]/ML
INJECTION, SOLUTION INTRAVENOUS; SUBCUTANEOUS AS NEEDED
Status: DISCONTINUED | OUTPATIENT
Start: 2021-06-11 | End: 2021-06-11 | Stop reason: HOSPADM

## 2021-06-11 RX ORDER — SODIUM CHLORIDE 0.9 % (FLUSH) 0.9 %
5-40 SYRINGE (ML) INJECTION EVERY 8 HOURS
Status: CANCELLED | OUTPATIENT
Start: 2021-06-11

## 2021-06-11 RX ORDER — PANTOPRAZOLE SODIUM 40 MG/1
40 TABLET, DELAYED RELEASE ORAL DAILY
Qty: 30 TABLET | Refills: 2 | Status: ON HOLD | OUTPATIENT
Start: 2021-06-11 | End: 2022-04-19 | Stop reason: SDUPTHER

## 2021-06-11 RX ORDER — BISMUTH SUBSALICYLATE 262 MG
1 TABLET,CHEWABLE ORAL DAILY
COMMUNITY

## 2021-06-11 RX ORDER — GUAIFENESIN 100 MG/5ML
81 LIQUID (ML) ORAL
Status: COMPLETED | OUTPATIENT
Start: 2021-06-11 | End: 2021-06-11

## 2021-06-11 RX ORDER — FENTANYL CITRATE 50 UG/ML
INJECTION, SOLUTION INTRAMUSCULAR; INTRAVENOUS AS NEEDED
Status: DISCONTINUED | OUTPATIENT
Start: 2021-06-11 | End: 2021-06-11 | Stop reason: HOSPADM

## 2021-06-11 RX ORDER — LIDOCAINE HYDROCHLORIDE 10 MG/ML
INJECTION INFILTRATION; PERINEURAL AS NEEDED
Status: DISCONTINUED | OUTPATIENT
Start: 2021-06-11 | End: 2021-06-11 | Stop reason: HOSPADM

## 2021-06-11 RX ORDER — GUAIFENESIN 100 MG/5ML
81 LIQUID (ML) ORAL DAILY
COMMUNITY

## 2021-06-11 RX ORDER — SODIUM CHLORIDE 0.9 % (FLUSH) 0.9 %
5-40 SYRINGE (ML) INJECTION AS NEEDED
Status: CANCELLED | OUTPATIENT
Start: 2021-06-11

## 2021-06-11 RX ORDER — SODIUM CHLORIDE 9 MG/ML
100 INJECTION, SOLUTION INTRAVENOUS CONTINUOUS
Status: DISCONTINUED | OUTPATIENT
Start: 2021-06-11 | End: 2021-06-11 | Stop reason: HOSPADM

## 2021-06-11 RX ORDER — HEPARIN SODIUM 200 [USP'U]/100ML
INJECTION, SOLUTION INTRAVENOUS
Status: DISCONTINUED | OUTPATIENT
Start: 2021-06-11 | End: 2021-06-11 | Stop reason: HOSPADM

## 2021-06-11 RX ORDER — MIDAZOLAM HYDROCHLORIDE 1 MG/ML
INJECTION, SOLUTION INTRAMUSCULAR; INTRAVENOUS AS NEEDED
Status: DISCONTINUED | OUTPATIENT
Start: 2021-06-11 | End: 2021-06-11 | Stop reason: HOSPADM

## 2021-06-11 RX ORDER — AMLODIPINE BESYLATE 5 MG/1
10 TABLET ORAL DAILY
Qty: 30 TABLET | Refills: 5 | Status: SHIPPED | OUTPATIENT
Start: 2021-06-11

## 2021-06-11 RX ADMIN — SODIUM CHLORIDE 100 ML/HR: 9 INJECTION, SOLUTION INTRAVENOUS at 10:45

## 2021-06-11 RX ADMIN — ASPIRIN 81 MG: 81 TABLET, CHEWABLE ORAL at 09:01

## 2021-06-11 NOTE — CARDIO/PULMONARY
Cardiac Rehab Note: chart review/referral     Cardiac cath 6/11/21 results:  \"Non obstructive CAD  Elevated filling pressure   Normal LV function \"    Smoking history assessed. Patient is a non smoker. Smoking Cessation Program link has not been added to the AVS.     Patient not seen at this time due to current condition as indicated in chart.

## 2021-06-11 NOTE — DISCHARGE INSTRUCTIONS
[address] [facility], [address]   [phone]  Jerry Doherty, [address] [facility]    [email or web address]    Patient Discharge Instructions    Jamee Mattson / [id number] : 1950    Admitted 2021 Discharged: 2021       · It is important that you take the medication exactly as they are prescribed. · Keep your medication in the bottles provided by the pharmacist and keep a list of the medication names, dosages, and times to be taken in your wallet. · Do not take other medications without consulting your doctor. BRING ALL OF YOUR MEDICINES TO YOUR OFFICE VISIT with Joseline Young MD or my nurse practitioner. Follow-up with Joseline Young MD or my nurse practitioner in 2 weeks. Cardiac Catheterization  Discharge Instructions    Transradial Catheterization Discharge Instructions (WRIST)    Discharge instructions: Your radial artery in your wrist was used for your cardiac catheterization. This site may be slightly bruised and sore following your procedure. Expect mild tingling or the hand and tenderness at the puncture site for up to 3 days. Excess movement of the wrist used should be avoided for the next 24-48 hours. 1. No lifting over 2 pounds (approximately a ½ gallon of milk) with this arm for 24 hours. 2. Keep the site of the procedure covered with a bandage for 24 hours. 3. You may shower the day after your procedure. Do not take a tub bath or submerge the puncture site in water for 48 hours. 4. No heavy impact activity/lifting > 10 pounds for 1 week. If bleeding of the wrist occurs at home:   If the site on your wrist where you had the catheterization procedure begins to bleed, do not panic. 1. Place 1 or 2 fingers over the puncture site and hold pressure to stop the bleeding. You may be able to feel your pulse as you hold pressure. 2. Lift your fingers after 5 minutes to see if the bleeding has stopped.    3. Once the bleeding has stopped, gently wipe the wrist area clean and cover with a bandage. If the bleeding from your wrist does not stop after 15 minutes, or if there is a large amount of bleeding or spurting, call 911 immediately (do not drive yourself to the hospital). Other concerns: The site may be slightly bruised and sore following your procedure. Should any of the following occur, contact your physician immediately:   1. Any cool or coldness of the arm, discoloration over a large area, ongoing numbness or any abnormal sensations , moderate to severe pain or swelling in the arm. 2. Redness, soreness, swelling, chills or fever, or colored drainage at the procedure site within 3-7 days after your procedure. If you have any further questions or concerns regarding your procedure please call the Cardiac Cath Lab office at [phone]. During regular business hours ask to speak to Dr. Jarrod Beauchamp. During non-business hours the answering service will answer. Ask to speak to the physician on call for [place] [facility].     Transfemoral Catheterization Discharge Instructions Arlester Hamman)     Do not drive, operate any machinery, or sign any legal documents for 24 hours after your procedure. You must have someone to drive you home.  You may take a shower 24 hours after your cardiac catheterization. Be sure to get the dressing wet and then remove it; gently wash the area with warm soapy water. Pat dry and leave open to air. To help prevent infections, be sure to keep the cath site clean and dry. No lotions, creams, powders, ointments, etc. in the cath site for approximately 1 week.  Do not take a tub bath, get in a hot tub or swimming pool for approximately 5 days or until the cath site is completely healed.  No strenuous activity or heavy lifting over 10 lbs. for 7 days.  Drink plenty of fluids for 24-48 hours after your cath to flush the contrast dye from your kidneys. No alcoholic beverages for 24 hours.   You may resume your previous diet (low fat, low cholesterol) after your cath.  After your cath, some bruising or discomfort is common during the healing process. Tylenol, 1-2 tablets every 6 hours as needed, is recommended if you experience any discomfort. If you experience any signs or symptoms of infection such as fever, chills, or poorly healing incision, persistent tenderness or swelling in the groin, redness and/or warmth to the touch, numbness, significant tingling or pain at the groin site or affected extremity, rash, drainage from the cath site, or if the leg feels tight or swollen, call your physician right away.  If bleeding at the cath site occurs, take a clean gauze pad and apply direct pressure to the groin just above the puncture site. Call 911 immediately, and continue to apply direct pressure until an ambulance gets to your location.  You may return to work  2  days after your cardiac cath if no groin bleeding. Information obtained by :  I understand that if any problems occur once I am at home I am to contact my physician. I understand and acknowledge receipt of the instructions indicated above. R.N.'s Signature                                                                  Date/Time                                                                                                                                              Patient or Representative Signature                                                          Date/Time      Sincere Trevino MD             [address] [facility], [address]    [phone]  [place], [address] [place]    [email or web address]

## 2021-06-11 NOTE — PROGRESS NOTES
TRANSFER - IN REPORT:    Verbal report received from LETITIA Hilario RN on Rupal Douglas  being received from [facility] for routine progression of care. Report consisted of patients Situation, Background, Assessment and Recommendations(SBAR). Information from the following report(s) Procedure Summary and MAR was reviewed with the receiving clinician. Opportunity for questions and clarification was provided. Assessment completed upon patients arrival to [address] [place] [address] and care assumed. Cardiac Cath Lab Recovery Arrival Note:    Rupal Douglas arrived to [facility] recovery area. Patient procedure= LHC. Patient on cardiac monitor, non-invasive blood pressure, SPO2 monitor. On room air. IV  of NS on pump at 50 ml/hr. Patient status doing well without problems. Patient is A&Ox 4. Patient reports no c/o. PROCEDURE SITE CHECK:    Procedure site:without any bleeding and no hematoma, no pain/discomfort reported at procedure site. No change in patient status. Continue to monitor patient and status.

## 2021-06-11 NOTE — PROGRESS NOTES
Cardiac Cath Lab Recovery Arrival Note:      Mary Petersen arrived to Cardiac Cath Lab, Recovery Area. Staff introduced to patient. Patient identifiers verified with NAME and DATE OF BIRTH. Procedure verified with patient. Consent forms reviewed and signed by patient or authorized representative and verified. Allergies verified. Patient and family oriented to department. Patient and family informed of procedure and plan of care. Questions answered with review. Patient prepped for procedure, per orders from physician, prior to arrival.    Patient on cardiac monitor, non-invasive blood pressure, SPO2 monitor. On RA. Patient is A&Ox 4. Patient reports no complaints. Patient in stretcher, in low position, with side rails up, call bell within reach, patient instructed to call if assistance as needed. Patient prep in: [address], [place] [address]. Patient family has pager # none  Family in: [address] [facility] waiting area.    Prep by: Chika Riddle RN

## 2021-06-11 NOTE — PROGRESS NOTES
Cardiac Cath Lab / End of Procedure / TRANSFER - OUT REPORT:    Verbal report given to JESUS NORRIS on Rupal Douglas for routine progression of care  . Report consisted of patients Situation, Background, Assessment and   Recommendations(SBAR). Information from the following report(s) SBAR and MAR was reviewed with the receiving nurse. Opportunity for questions and clarification was provided. Medications wasted with JESUS NORRIS.

## 2021-06-11 NOTE — PROGRESS NOTES
Brief Procedure Note        Indication:   CP, Abn stress test     Procedure:   LHC, LV gram       Complications: None   Blood loss: Minimal   Condition: Stable     Brief procedure Result:   Non obstructive CAD  Elevated filling pressure   Normal LV function     Recommendations:   Medical therapy and risk factor modification   PPI trial for CP   Increase amlodipine for BP control       Full note and recommendations to follow. '

## 2021-06-11 NOTE — PROGRESS NOTES
Ambulate in muro to [facility] with pt. Gait steady. Right radial dressing dry and intact. Pt denies c/o. DC instructions reviewed with pt and wife. Both verbalize understanding. SL dc'd without difficulty. Pt wheeled to front door to be drive home by wife.

## 2021-11-26 ENCOUNTER — TRANSCRIBE ORDER (OUTPATIENT)
Dept: SCHEDULING | Age: 71
End: 2021-11-26

## 2021-11-26 DIAGNOSIS — M25.512 LEFT SHOULDER PAIN: Primary | ICD-10-CM

## 2021-12-10 ENCOUNTER — HOSPITAL ENCOUNTER (OUTPATIENT)
Dept: MRI IMAGING | Age: 71
Discharge: HOME | End: 2021-12-10
Attending: NURSE PRACTITIONER
Payer: MEDICARE

## 2021-12-10 DIAGNOSIS — M25.512 LEFT SHOULDER PAIN: ICD-10-CM

## 2021-12-10 PROCEDURE — 73221 MRI JOINT UPR EXTREM W/O DYE: CPT

## 2022-03-18 PROBLEM — N30.40 RADIATION CYSTITIS: Status: ACTIVE | Noted: 2019-05-17

## 2022-03-19 PROBLEM — L59.8 SOFT TISSUE RADIONECROSIS: Status: ACTIVE | Noted: 2019-05-17
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
## 2022-04-18 ENCOUNTER — APPOINTMENT (OUTPATIENT)
Dept: GENERAL RADIOLOGY | Age: 72
DRG: 313 | End: 2022-04-18
Attending: EMERGENCY MEDICINE
Payer: MEDICARE

## 2022-04-18 ENCOUNTER — HOSPITAL ENCOUNTER (INPATIENT)
Age: 72
LOS: 1 days | Discharge: HOME | DRG: 313 | End: 2022-04-19
Attending: EMERGENCY MEDICINE | Admitting: STUDENT IN AN ORGANIZED HEALTH CARE EDUCATION/TRAINING PROGRAM
Payer: MEDICARE

## 2022-04-18 DIAGNOSIS — R07.9 CHEST PAIN WITH HIGH RISK FOR CARDIAC ETIOLOGY: Primary | ICD-10-CM

## 2022-04-18 DIAGNOSIS — I20.0 UNSTABLE ANGINA (HCC): ICD-10-CM

## 2022-04-18 PROBLEM — N17.9 AKI (ACUTE KIDNEY INJURY) (HCC): Status: ACTIVE | Noted: 2022-04-18

## 2022-04-18 PROBLEM — D72.829 LEUCOCYTOSIS: Status: ACTIVE | Noted: 2022-04-18

## 2022-04-18 LAB
ALBUMIN SERPL-MCNC: 3.5 G/DL (ref 3.5–5)
ALBUMIN/GLOB SERPL: 0.8 {RATIO} (ref 1.1–2.2)
ALP SERPL-CCNC: 87 U/L (ref 45–117)
ALT SERPL-CCNC: 25 U/L (ref 12–78)
ANION GAP SERPL CALC-SCNC: 3 MMOL/L (ref 5–15)
AST SERPL-CCNC: 22 U/L (ref 15–37)
BASOPHILS # BLD: 0.1 K/UL (ref 0–0.1)
BASOPHILS NFR BLD: 0 % (ref 0–1)
BILIRUB SERPL-MCNC: 0.3 MG/DL (ref 0.2–1)
BNP SERPL-MCNC: 53 PG/ML
BUN SERPL-MCNC: 16 MG/DL (ref 6–20)
BUN/CREAT SERPL: 12 (ref 12–20)
CALCIUM SERPL-MCNC: 9 MG/DL (ref 8.5–10.1)
CHLORIDE SERPL-SCNC: 106 MMOL/L (ref 97–108)
CO2 SERPL-SCNC: 30 MMOL/L (ref 21–32)
CREAT SERPL-MCNC: 1.33 MG/DL (ref 0.7–1.3)
DIFFERENTIAL METHOD BLD: ABNORMAL
EOSINOPHIL # BLD: 0.1 K/UL (ref 0–0.4)
EOSINOPHIL NFR BLD: 1 % (ref 0–7)
ERYTHROCYTE [DISTWIDTH] IN BLOOD BY AUTOMATED COUNT: 14.7 % (ref 11.5–14.5)
GLOBULIN SER CALC-MCNC: 4.2 G/DL (ref 2–4)
GLUCOSE SERPL-MCNC: 100 MG/DL (ref 65–100)
HCT VFR BLD AUTO: 39.3 % (ref 36.6–50.3)
HGB BLD-MCNC: 12.5 G/DL (ref 12.1–17)
IMM GRANULOCYTES # BLD AUTO: 0 K/UL (ref 0–0.04)
IMM GRANULOCYTES NFR BLD AUTO: 0 % (ref 0–0.5)
LYMPHOCYTES # BLD: 2.4 K/UL (ref 0.8–3.5)
LYMPHOCYTES NFR BLD: 20 % (ref 12–49)
MCH RBC QN AUTO: 26.9 PG (ref 26–34)
MCHC RBC AUTO-ENTMCNC: 31.8 G/DL (ref 30–36.5)
MCV RBC AUTO: 84.7 FL (ref 80–99)
MONOCYTES # BLD: 0.9 K/UL (ref 0–1)
MONOCYTES NFR BLD: 8 % (ref 5–13)
NEUTS SEG # BLD: 8.6 K/UL (ref 1.8–8)
NEUTS SEG NFR BLD: 71 % (ref 32–75)
NRBC # BLD: 0 K/UL (ref 0–0.01)
NRBC BLD-RTO: 0 PER 100 WBC
PLATELET # BLD AUTO: 242 K/UL (ref 150–400)
PMV BLD AUTO: 11.2 FL (ref 8.9–12.9)
POTASSIUM SERPL-SCNC: 4.1 MMOL/L (ref 3.5–5.1)
PROT SERPL-MCNC: 7.7 G/DL (ref 6.4–8.2)
RBC # BLD AUTO: 4.64 M/UL (ref 4.1–5.7)
SODIUM SERPL-SCNC: 139 MMOL/L (ref 136–145)
TROPONIN-HIGH SENSITIVITY: 10 NG/L (ref 0–76)
TROPONIN-HIGH SENSITIVITY: 10 NG/L (ref 0–76)
WBC # BLD AUTO: 12.1 K/UL (ref 4.1–11.1)

## 2022-04-18 PROCEDURE — 74011250637 HC RX REV CODE- 250/637: Performed by: EMERGENCY MEDICINE

## 2022-04-18 PROCEDURE — 85025 COMPLETE CBC W/AUTO DIFF WBC: CPT

## 2022-04-18 PROCEDURE — 65270000046 HC RM TELEMETRY

## 2022-04-18 PROCEDURE — 84484 ASSAY OF TROPONIN QUANT: CPT

## 2022-04-18 PROCEDURE — 74011250636 HC RX REV CODE- 250/636: Performed by: EMERGENCY MEDICINE

## 2022-04-18 PROCEDURE — 74011250636 HC RX REV CODE- 250/636: Performed by: STUDENT IN AN ORGANIZED HEALTH CARE EDUCATION/TRAINING PROGRAM

## 2022-04-18 PROCEDURE — 74011000250 HC RX REV CODE- 250: Performed by: STUDENT IN AN ORGANIZED HEALTH CARE EDUCATION/TRAINING PROGRAM

## 2022-04-18 PROCEDURE — 80053 COMPREHEN METABOLIC PANEL: CPT

## 2022-04-18 PROCEDURE — 99285 EMERGENCY DEPT VISIT HI MDM: CPT

## 2022-04-18 PROCEDURE — 36415 COLL VENOUS BLD VENIPUNCTURE: CPT

## 2022-04-18 PROCEDURE — 93005 ELECTROCARDIOGRAM TRACING: CPT

## 2022-04-18 PROCEDURE — 96374 THER/PROPH/DIAG INJ IV PUSH: CPT

## 2022-04-18 PROCEDURE — 83880 ASSAY OF NATRIURETIC PEPTIDE: CPT

## 2022-04-18 PROCEDURE — 71045 X-RAY EXAM CHEST 1 VIEW: CPT

## 2022-04-18 RX ORDER — GUAIFENESIN 100 MG/5ML
81 LIQUID (ML) ORAL DAILY
Status: DISCONTINUED | OUTPATIENT
Start: 2022-04-19 | End: 2022-04-19 | Stop reason: HOSPADM

## 2022-04-18 RX ORDER — ONDANSETRON 2 MG/ML
4 INJECTION INTRAMUSCULAR; INTRAVENOUS
Status: DISCONTINUED | OUTPATIENT
Start: 2022-04-18 | End: 2022-04-19 | Stop reason: HOSPADM

## 2022-04-18 RX ORDER — ACETAMINOPHEN 650 MG/1
650 SUPPOSITORY RECTAL
Status: DISCONTINUED | OUTPATIENT
Start: 2022-04-18 | End: 2022-04-19 | Stop reason: HOSPADM

## 2022-04-18 RX ORDER — ACETAMINOPHEN 325 MG/1
650 TABLET ORAL
Status: DISCONTINUED | OUTPATIENT
Start: 2022-04-18 | End: 2022-04-19 | Stop reason: HOSPADM

## 2022-04-18 RX ORDER — ATORVASTATIN CALCIUM 20 MG/1
20 TABLET, FILM COATED ORAL DAILY
Status: DISCONTINUED | OUTPATIENT
Start: 2022-04-19 | End: 2022-04-19 | Stop reason: HOSPADM

## 2022-04-18 RX ORDER — PANTOPRAZOLE SODIUM 40 MG/1
40 TABLET, DELAYED RELEASE ORAL DAILY
Status: DISCONTINUED | OUTPATIENT
Start: 2022-04-19 | End: 2022-04-19 | Stop reason: HOSPADM

## 2022-04-18 RX ORDER — ACETAMINOPHEN 325 MG/1
650 TABLET ORAL
Status: DISCONTINUED | OUTPATIENT
Start: 2022-04-18 | End: 2022-04-18 | Stop reason: SDUPTHER

## 2022-04-18 RX ORDER — ENOXAPARIN SODIUM 100 MG/ML
40 INJECTION SUBCUTANEOUS EVERY 24 HOURS
Status: DISCONTINUED | OUTPATIENT
Start: 2022-04-18 | End: 2022-04-19 | Stop reason: HOSPADM

## 2022-04-18 RX ORDER — FENTANYL CITRATE 50 UG/ML
50 INJECTION, SOLUTION INTRAMUSCULAR; INTRAVENOUS
Status: COMPLETED | OUTPATIENT
Start: 2022-04-18 | End: 2022-04-18

## 2022-04-18 RX ORDER — AMLODIPINE BESYLATE 5 MG/1
10 TABLET ORAL DAILY
Status: DISCONTINUED | OUTPATIENT
Start: 2022-04-19 | End: 2022-04-19 | Stop reason: HOSPADM

## 2022-04-18 RX ORDER — SODIUM CHLORIDE 9 MG/ML
75 INJECTION, SOLUTION INTRAVENOUS CONTINUOUS
Status: DISCONTINUED | OUTPATIENT
Start: 2022-04-18 | End: 2022-04-19

## 2022-04-18 RX ORDER — SODIUM CHLORIDE 0.9 % (FLUSH) 0.9 %
5-40 SYRINGE (ML) INJECTION AS NEEDED
Status: DISCONTINUED | OUTPATIENT
Start: 2022-04-18 | End: 2022-04-19 | Stop reason: HOSPADM

## 2022-04-18 RX ORDER — GUAIFENESIN 100 MG/5ML
325 LIQUID (ML) ORAL
Status: COMPLETED | OUTPATIENT
Start: 2022-04-18 | End: 2022-04-18

## 2022-04-18 RX ORDER — SODIUM CHLORIDE 0.9 % (FLUSH) 0.9 %
5-40 SYRINGE (ML) INJECTION EVERY 8 HOURS
Status: DISCONTINUED | OUTPATIENT
Start: 2022-04-18 | End: 2022-04-19 | Stop reason: HOSPADM

## 2022-04-18 RX ORDER — POLYETHYLENE GLYCOL 3350 17 G/17G
17 POWDER, FOR SOLUTION ORAL DAILY PRN
Status: DISCONTINUED | OUTPATIENT
Start: 2022-04-18 | End: 2022-04-19 | Stop reason: HOSPADM

## 2022-04-18 RX ORDER — ONDANSETRON 4 MG/1
4 TABLET, ORALLY DISINTEGRATING ORAL
Status: DISCONTINUED | OUTPATIENT
Start: 2022-04-18 | End: 2022-04-19 | Stop reason: HOSPADM

## 2022-04-18 RX ADMIN — ASPIRIN 324 MG: 81 TABLET, CHEWABLE ORAL at 20:09

## 2022-04-18 RX ADMIN — FENTANYL CITRATE 50 MCG: 50 INJECTION, SOLUTION INTRAMUSCULAR; INTRAVENOUS at 20:00

## 2022-04-18 RX ADMIN — NITROGLYCERIN 1 INCH: 20 OINTMENT TOPICAL at 20:01

## 2022-04-18 RX ADMIN — SODIUM CHLORIDE 75 ML/HR: 9 INJECTION, SOLUTION INTRAVENOUS at 21:13

## 2022-04-18 RX ADMIN — SODIUM CHLORIDE, PRESERVATIVE FREE 10 ML: 5 INJECTION INTRAVENOUS at 21:58

## 2022-04-18 RX ADMIN — ENOXAPARIN SODIUM 40 MG: 100 INJECTION SUBCUTANEOUS at 21:14

## 2022-04-18 NOTE — ED PROVIDER NOTES
EMERGENCY DEPARTMENT HISTORY AND PHYSICAL EXAM      Date: 4/18/2022  Patient Name: Ivory Rascon  Patient Age and Sex: 66 y.o. male     History of Presenting Illness     Chief Complaint   Patient presents with    Chest Pain     midsternal chest pains sharp and sometimes it is pressure. History Provided By: Patient    HPI: Ivory Rascno is a 73-year-old male with a history of CAD, hypertension, hyperlipidemia presenting for chest pain. Patient states that 2 days ago while he was in [place] started having some chest tightness as well as sharp chest pains. Not associated with any shortness of breath, nausea, vomiting, diaphoresis. States that it has been constant since then but intermittently worse. No triggers. States that he has seen cardiology before and has had a stress test as well as catheterization done last year. Does have a history of high blood pressure on medications. Does not have any diabetes or smoker. Patient states that right now his pain is about a 6 out of 10. States that when he was in [place] he tried nitro and it did alleviate his pain some. There are no other complaints, changes, or physical findings at this time. PCP: Leila Mari NP    No current facility-administered medications on file prior to encounter. Current Outpatient Medications on File Prior to Encounter   Medication Sig Dispense Refill    aspirin 81 mg chewable tablet Take 81 mg by mouth daily.  nitroglycerin (NITROSTAT) 0.3 mg SL tablet by SubLINGual route every five (5) minutes as needed for Chest Pain.  multivitamin (ONE A DAY) tablet Take 1 Tablet by mouth daily.  amLODIPine (NORVASC) 5 mg tablet Take 2 Tablets by mouth daily. 30 Tablet 5    pantoprazole (Protonix) 40 mg tablet Take 1 Tablet by mouth daily. 30 Tablet 2    atorvastatin (LIPITOR) 20 mg tablet Take 20 mg by mouth daily.  albuterol sulfate (VENTOLIN HFA IN) Take 2 Puffs by inhalation.          Past History     Past Medical History:  Past Medical History:   Diagnosis Date    Cancer [facility])     prostate x 2 - 2008/2016 - prostate removed 2008 and radiation 2017    Hypertension     Ill-defined condition     increased cholesterol       Past Surgical History:  Past Surgical History:   Procedure Laterality Date    COLONOSCOPY N/A 10/10/2018    COL[facility]COPY [facility] at [facility] ENDOSCOPY    HX [facility] HX ORTHOPAEDIC Left     for torn meniscus    HX PROSTATECTOMY  2008       Family History:  Family History   Problem Relation Age of Onset    Cancer Father         prostate    Mult Sclerosis Sister     Cancer Brother         ? where    Alcohol abuse Brother     Other Sister         \"something in her pancreas\"       Social History:  Social History     Tobacco Use    Smoking status: Never Smoker    Smokeless tobacco: Never Used   Substance Use Topics    Alcohol use: No    Drug use: Never       Allergies:  No Known Allergies      Review of Systems   Review of Systems   Constitutional: Negative for chills and fever. Respiratory: Negative for cough and shortness of breath. Cardiovascular: Positive for chest pain. Gastrointestinal: Negative for abdominal pain, constipation, diarrhea, nausea and vomiting. Genitourinary: Negative for dysuria, frequency and hematuria. Neurological: Negative for weakness and numbness. All other systems reviewed and are negative. Physical Exam   Physical Exam  Vitals and nursing note reviewed. Constitutional:       Appearance: He is well-developed. HENT:      Head: Normocephalic and atraumatic. Nose: Nose normal.      Mouth/Throat:      Mouth: Mucous membranes are moist.   Eyes:      Extraocular Movements: Extraocular movements intact. Conjunctiva/sclera: Conjunctivae normal.   Cardiovascular:      Rate and Rhythm: Normal rate and regular rhythm. Pulmonary:      Effort: Pulmonary effort is normal. No respiratory distress.       Breath sounds: Normal breath sounds. Chest:      Chest wall: No tenderness. Abdominal:      General: There is no distension. Palpations: Abdomen is soft. Tenderness: There is no abdominal tenderness. Musculoskeletal:         General: Normal range of motion. Cervical back: Normal range of motion and neck supple. Skin:     General: Skin is warm and dry. Neurological:      General: No focal deficit present. Mental Status: He is alert and oriented to person, place, and time. Mental status is at baseline. Psychiatric:         Mood and Affect: Mood normal.          Diagnostic Study Results     Labs -     Recent Results (from the past 12 hour(s))   EKG, 12 LEAD, INITIAL    Collection Time: 04/18/22  5:26 PM   Result Value Ref Range    Ventricular Rate 81 BPM    Atrial Rate 81 BPM    P-R Interval 174 ms    QRS Duration 86 ms    Q-T Interval 366 ms    QTC Calculation (Bezet) 425 ms    Calculated P Axis 74 degrees    Calculated R Axis 76 degrees    Calculated T Axis 13 degrees    Diagnosis       Sinus rhythm with occasional premature ventricular complexes  Moderate voltage criteria for LVH, may be normal variant  Nonspecific ST and T wave abnormality  No previous ECGs available     CBC WITH AUTOMATED DIFF    Collection Time: 04/18/22  5:33 PM   Result Value Ref Range    WBC 12.1 (H) 4.1 - 11.1 K/uL    RBC 4.64 4.10 - 5.70 M/uL    HGB 12.5 12.1 - 17.0 g/dL    HCT 39.3 36.6 - 50.3 %    MCV 84.7 80.0 - 99.0 FL    MCH 26.9 26.0 - 34.0 PG    MCHC 31.8 30.0 - 36.5 g/dL    RDW 14.7 (H) 11.5 - 14.5 %    PLATELET 390 779 - 864 K/uL    MPV 11.2 8.9 - 12.9 FL    NRBC 0.0 0  WBC    ABSOLUTE NRBC 0.00 0.00 - 0.01 K/uL    NEUTROPHILS 71 32 - 75 %    LYMPHOCYTES 20 12 - 49 %    MONOCYTES 8 5 - 13 %    EOSINOPHILS 1 0 - 7 %    BASOPHILS 0 0 - 1 %    IMMATURE GRANULOCYTES 0 0.0 - 0.5 %    ABS. NEUTROPHILS 8.6 (H) 1.8 - 8.0 K/UL    ABS. LYMPHOCYTES 2.4 0.8 - 3.5 K/UL    ABS. MONOCYTES 0.9 0.0 - 1.0 K/UL    ABS. EOSINOPHILS 0.1 0.0 - 0.4 K/UL    ABS. BASOPHILS 0.1 0.0 - 0.1 K/UL    ABS. IMM. GRANS. 0.0 0.00 - 0.04 K/UL    DF AUTOMATED     METABOLIC PANEL, COMPREHENSIVE    Collection Time: 04/18/22  5:33 PM   Result Value Ref Range    Sodium 139 136 - 145 mmol/L    Potassium 4.1 3.5 - 5.1 mmol/L    Chloride 106 97 - 108 mmol/L    CO2 30 21 - 32 mmol/L    Anion gap 3 (L) 5 - 15 mmol/L    Glucose 100 65 - 100 mg/dL    BUN 16 6 - 20 MG/DL    Creatinine 1.33 (H) 0.70 - 1.30 MG/DL    BUN/Creatinine ratio 12 12 - 20      GFR est AA >60 >60 ml/min/1.73m2    GFR est non-AA 53 (L) >60 ml/min/1.73m2    Calcium 9.0 8.5 - 10.1 MG/DL    Bilirubin, total 0.3 0.2 - 1.0 MG/DL    ALT (SGPT) 25 12 - 78 U/L    AST (SGOT) 22 15 - 37 U/L    Alk. phosphatase 87 45 - 117 U/L    Protein, total 7.7 6.4 - 8.2 g/dL    Albumin 3.5 3.5 - 5.0 g/dL    Globulin 4.2 (H) 2.0 - 4.0 g/dL    A-G Ratio 0.8 (L) 1.1 - 2.2     NT-PRO BNP    Collection Time: 04/18/22  5:33 PM   Result Value Ref Range    NT pro-BNP 53 <125 PG/ML   TROPONIN-HIGH SENSITIVITY    Collection Time: 04/18/22  5:33 PM   Result Value Ref Range    Troponin-High Sensitivity 10 0 - 76 ng/L       Radiologic Studies -   XR CHEST PORT   Final Result      No acute process. CT Results  (Last 48 hours)    None        CXR Results  (Last 48 hours)               04/18/22 1917  XR CHEST PORT Final result    Impression:      No acute process. Narrative:  EXAM:  XR CHEST PORT       INDICATION:  Chest Pain       COMPARISON:  None. FINDINGS:       A portable AP radiograph of the chest was obtained at 19:06 hours. The patient   is on a cardiac monitor. The lungs are clear. The cardiac and mediastinal   contours and pulmonary vascularity are normal.  The bones and soft tissues are   unremarkable. Medical Decision Making   I am the first provider for this patient.     I reviewed the vital signs, available nursing notes, past medical history, past surgical history, family history and social history. Vital Signs-Reviewed the patient's vital signs. Patient Vitals for the past 12 hrs:   Temp Pulse Resp BP SpO2   04/18/22 2011  79 16  97 %   04/18/22 2001  76  (!) 156/71    04/18/22 1956  75 18 (!) 156/71 96 %   04/18/22 1941  86 21  97 %   04/18/22 1930  72 18 (!) 154/68 97 %   04/18/22 1723 97.9 °F (36.6 °C) 90 16 (!) 173/74 99 %       Records Reviewed: Nursing Notes and Old Medical Records    Provider Notes (Medical Decision Making):   Patient presenting with chest pain with significant risk factors including his age, hyperlipidemia and hypertension as well as history of CAD. Concerning story that this has been going on for 2 days now without any relief but some alleviation with nitroglycerin. Plan will be to get labs, speak with cardiology and admit. Nitropaste ordered. Hypertensive here    ED Course:   Initial assessment performed. The patients presenting problems have been discussed, and they are in agreement with the care plan formulated and outlined with them. I have encouraged them to ask questions as they arise throughout their visit. ED Course as of 04/18/22 2044 Mon Apr 18, 2022 1922 Per 6/2021 cards note: cath    Findings/PostOp Diagnosis:   1. Non obstructive (mild) coronary artery disease   2. Normal LV function   3. Elevated left sided filling pressures      Recommendations:   1. Routine post procedure & access site care   2. Continue aggressive medical management and risk factor modification    [JS]   1924 EKG interpreted by me. Shows normal sinus rhythm with a heart rate of 90. Has infrequent PVCs. No STEMI noted [JS]   1939 Spoke with Jorge Hutchins, cardiology who agrees with admission and they will see him in the morning.  [JS]   2043 After nitro, fentanyl and aspirin, patient's pain went from a 7 down to a 4 [JS]      ED Course User Index  [JS] Nima Maxwell MD     Critical Care Time:   0    Disposition:    Admission Note:  Patient is being admitted to the hospital by Dr. Roverto Hyman, Service: Hospitalist.  The results of their tests and reasons for their admission have been discussed with them and available family. They convey agreement and understanding for the need to be admitted and for their admission diagnosis. Diagnosis     Clinical Impression:   1. Chest pain with high risk for cardiac etiology    2. Unstable angina (HCC)        Attestations:  Rufina Pichardo M.D. Please note that this dictation was completed with [employer], the computer voice recognition software. Quite often unanticipated grammatical, syntax, homophones, and other interpretive errors are inadvertently transcribed by the computer software. Please disregard these errors. Please excuse any errors that have escaped final proofreading. Thank you.

## 2022-04-19 ENCOUNTER — APPOINTMENT (OUTPATIENT)
Dept: NON INVASIVE DIAGNOSTICS | Age: 72
DRG: 313 | End: 2022-04-19
Attending: STUDENT IN AN ORGANIZED HEALTH CARE EDUCATION/TRAINING PROGRAM
Payer: MEDICARE

## 2022-04-19 ENCOUNTER — APPOINTMENT (OUTPATIENT)
Dept: CT IMAGING | Age: 72
DRG: 313 | End: 2022-04-19
Attending: STUDENT IN AN ORGANIZED HEALTH CARE EDUCATION/TRAINING PROGRAM
Payer: MEDICARE

## 2022-04-19 VITALS
TEMPERATURE: 98.1 F | WEIGHT: 178.57 LBS | DIASTOLIC BLOOD PRESSURE: 58 MMHG | HEIGHT: 68 IN | SYSTOLIC BLOOD PRESSURE: 144 MMHG | OXYGEN SATURATION: 100 % | HEART RATE: 56 BPM | RESPIRATION RATE: 16 BRPM | BODY MASS INDEX: 27.06 KG/M2

## 2022-04-19 LAB
ANION GAP SERPL CALC-SCNC: 7 MMOL/L (ref 5–15)
BUN SERPL-MCNC: 15 MG/DL (ref 6–20)
BUN/CREAT SERPL: 14 (ref 12–20)
CALCIUM SERPL-MCNC: 8.4 MG/DL (ref 8.5–10.1)
CHLORIDE SERPL-SCNC: 110 MMOL/L (ref 97–108)
CO2 SERPL-SCNC: 25 MMOL/L (ref 21–32)
CREAT SERPL-MCNC: 1.04 MG/DL (ref 0.7–1.3)
ECHO AO ASC DIAM: 3.2 CM
ECHO AO ASCENDING AORTA INDEX: 1.64 CM/M2
ECHO AV AREA PEAK VELOCITY: 3.2 CM2
ECHO AV AREA PEAK VELOCITY: 3.3 CM2
ECHO AV AREA VTI: 3.3 CM2
ECHO AV AREA/BSA VTI: 1.7 CM2/M2
ECHO AV CUSP MM: 2 CM
ECHO AV MEAN GRADIENT: 2 MMHG
ECHO AV MEAN VELOCITY: 0.7 M/S
ECHO AV PEAK GRADIENT: 4 MMHG
ECHO AV PEAK GRADIENT: 4 MMHG
ECHO AV PEAK VELOCITY: 1 M/S
ECHO AV PEAK VELOCITY: 1 M/S
ECHO AV VTI: 21.9 CM
ECHO EST RA PRESSURE: 3 MMHG
ECHO LA DIAMETER INDEX: 1.74 CM/M2
ECHO LA DIAMETER: 3.4 CM
ECHO LA VOL 2C: 59 ML (ref 18–58)
ECHO LA VOL 4C: 69 ML (ref 18–58)
ECHO LA VOL BP: 64 ML (ref 18–58)
ECHO LA VOL/BSA BIPLANE: 33 ML/M2 (ref 16–34)
ECHO LA VOLUME AREA LENGTH: 75 ML
ECHO LA VOLUME INDEX A2C: 30 ML/M2 (ref 16–34)
ECHO LA VOLUME INDEX A4C: 35 ML/M2 (ref 16–34)
ECHO LA VOLUME INDEX AREA LENGTH: 38 ML/M2 (ref 16–34)
ECHO LV E' LATERAL VELOCITY: 12 CM/S
ECHO LV E' SEPTAL VELOCITY: 6 CM/S
ECHO LV FRACTIONAL SHORTENING: 32 % (ref 28–44)
ECHO LV INTERNAL DIMENSION DIASTOLE INDEX: 1.95 CM/M2
ECHO LV INTERNAL DIMENSION DIASTOLIC: 3.8 CM (ref 4.2–5.9)
ECHO LV INTERNAL DIMENSION SYSTOLIC INDEX: 1.33 CM/M2
ECHO LV INTERNAL DIMENSION SYSTOLIC: 2.6 CM
ECHO LV IVSD: 1.5 CM (ref 0.6–1)
ECHO LV MASS 2D: 216.6 G (ref 88–224)
ECHO LV MASS INDEX 2D: 111.1 G/M2 (ref 49–115)
ECHO LV POSTERIOR WALL DIASTOLIC: 1.5 CM (ref 0.6–1)
ECHO LV RELATIVE WALL THICKNESS RATIO: 0.79
ECHO LVOT AREA: 3.5 CM2
ECHO LVOT AV VTI INDEX: 0.93
ECHO LVOT DIAM: 2.1 CM
ECHO LVOT MEAN GRADIENT: 2 MMHG
ECHO LVOT PEAK GRADIENT: 3 MMHG
ECHO LVOT PEAK GRADIENT: 3 MMHG
ECHO LVOT PEAK VELOCITY: 0.9 M/S
ECHO LVOT PEAK VELOCITY: 0.9 M/S
ECHO LVOT STROKE VOLUME INDEX: 36 ML/M2
ECHO LVOT SV: 70.3 ML
ECHO LVOT VTI: 20.3 CM
ECHO MV A VELOCITY: 0.85 M/S
ECHO MV E DECELERATION TIME (DT): 191.1 MS
ECHO MV E VELOCITY: 0.65 M/S
ECHO MV E/A RATIO: 0.76
ECHO MV E/E' LATERAL: 5.42
ECHO MV E/E' RATIO (AVERAGED): 8.13
ECHO MV E/E' SEPTAL: 10.83
ECHO PULMONARY ARTERY END DIASTOLIC PRESSURE: 4 MMHG
ECHO PV MAX VELOCITY: 1.1 M/S
ECHO PV PEAK GRADIENT: 5 MMHG
ECHO PV REGURGITANT MAX VELOCITY: 1 M/S
ECHO RIGHT VENTRICULAR SYSTOLIC PRESSURE (RVSP): 13 MMHG
ECHO RV FREE WALL PEAK S': 14 CM/S
ECHO RV INTERNAL DIMENSION: 4.2 CM
ECHO TV REGURGITANT MAX VELOCITY: 1.62 M/S
ECHO TV REGURGITANT PEAK GRADIENT: 11 MMHG
ERYTHROCYTE [DISTWIDTH] IN BLOOD BY AUTOMATED COUNT: 14.8 % (ref 11.5–14.5)
GLUCOSE SERPL-MCNC: 93 MG/DL (ref 65–100)
HCT VFR BLD AUTO: 34.4 % (ref 36.6–50.3)
HGB BLD-MCNC: 10.8 G/DL (ref 12.1–17)
MAGNESIUM SERPL-MCNC: 2.2 MG/DL (ref 1.6–2.4)
MCH RBC QN AUTO: 26.8 PG (ref 26–34)
MCHC RBC AUTO-ENTMCNC: 31.4 G/DL (ref 30–36.5)
MCV RBC AUTO: 85.4 FL (ref 80–99)
NRBC # BLD: 0 K/UL (ref 0–0.01)
NRBC BLD-RTO: 0 PER 100 WBC
PLATELET # BLD AUTO: 220 K/UL (ref 150–400)
PMV BLD AUTO: 11.7 FL (ref 8.9–12.9)
POTASSIUM SERPL-SCNC: 3.8 MMOL/L (ref 3.5–5.1)
RBC # BLD AUTO: 4.03 M/UL (ref 4.1–5.7)
SODIUM SERPL-SCNC: 142 MMOL/L (ref 136–145)
TROPONIN-HIGH SENSITIVITY: 10 NG/L (ref 0–76)
WBC # BLD AUTO: 9.9 K/UL (ref 4.1–11.1)

## 2022-04-19 PROCEDURE — 93306 TTE W/DOPPLER COMPLETE: CPT

## 2022-04-19 PROCEDURE — 71275 CT ANGIOGRAPHY CHEST: CPT

## 2022-04-19 PROCEDURE — 74011250637 HC RX REV CODE- 250/637

## 2022-04-19 PROCEDURE — 36415 COLL VENOUS BLD VENIPUNCTURE: CPT

## 2022-04-19 PROCEDURE — 80048 BASIC METABOLIC PNL TOTAL CA: CPT

## 2022-04-19 PROCEDURE — 83735 ASSAY OF MAGNESIUM: CPT

## 2022-04-19 PROCEDURE — 74011250637 HC RX REV CODE- 250/637: Performed by: STUDENT IN AN ORGANIZED HEALTH CARE EDUCATION/TRAINING PROGRAM

## 2022-04-19 PROCEDURE — 85027 COMPLETE CBC AUTOMATED: CPT

## 2022-04-19 PROCEDURE — 74011000636 HC RX REV CODE- 636: Performed by: INTERNAL MEDICINE

## 2022-04-19 PROCEDURE — 74011000250 HC RX REV CODE- 250: Performed by: STUDENT IN AN ORGANIZED HEALTH CARE EDUCATION/TRAINING PROGRAM

## 2022-04-19 PROCEDURE — 93005 ELECTROCARDIOGRAM TRACING: CPT

## 2022-04-19 PROCEDURE — 74011250636 HC RX REV CODE- 250/636: Performed by: STUDENT IN AN ORGANIZED HEALTH CARE EDUCATION/TRAINING PROGRAM

## 2022-04-19 PROCEDURE — 84484 ASSAY OF TROPONIN QUANT: CPT

## 2022-04-19 RX ORDER — KETOROLAC TROMETHAMINE 30 MG/ML
15 INJECTION, SOLUTION INTRAMUSCULAR; INTRAVENOUS
Status: COMPLETED | OUTPATIENT
Start: 2022-04-19 | End: 2022-04-19

## 2022-04-19 RX ORDER — PANTOPRAZOLE SODIUM 40 MG/1
TABLET, DELAYED RELEASE ORAL
Qty: 30 TABLET | Refills: 2 | Status: SHIPPED | OUTPATIENT
Start: 2022-04-19

## 2022-04-19 RX ORDER — NITROGLYCERIN 0.4 MG/1
0.4 TABLET SUBLINGUAL AS NEEDED
Status: DISCONTINUED | OUTPATIENT
Start: 2022-04-19 | End: 2022-04-19 | Stop reason: HOSPADM

## 2022-04-19 RX ADMIN — ASPIRIN 81 MG: 81 TABLET, CHEWABLE ORAL at 08:43

## 2022-04-19 RX ADMIN — IOPAMIDOL 100 ML: 755 INJECTION, SOLUTION INTRAVENOUS at 09:38

## 2022-04-19 RX ADMIN — SODIUM CHLORIDE, PRESERVATIVE FREE 10 ML: 5 INJECTION INTRAVENOUS at 06:25

## 2022-04-19 RX ADMIN — AMLODIPINE BESYLATE 10 MG: 5 TABLET ORAL at 08:43

## 2022-04-19 RX ADMIN — KETOROLAC TROMETHAMINE 15 MG: 30 INJECTION, SOLUTION INTRAMUSCULAR at 13:51

## 2022-04-19 RX ADMIN — PANTOPRAZOLE SODIUM 40 MG: 40 TABLET, DELAYED RELEASE ORAL at 08:43

## 2022-04-19 RX ADMIN — ATORVASTATIN CALCIUM 20 MG: 20 TABLET, FILM COATED ORAL at 08:43

## 2022-04-19 RX ADMIN — NITROGLYCERIN 0.4 MG: 0.4 TABLET, ORALLY DISINTEGRATING SUBLINGUAL at 04:41

## 2022-04-19 NOTE — PROGRESS NOTES
Hospital follow-up PCP transitional care appointment has been scheduled with TASNEEM Perez on 4/22/2022 at 0930. Pending patient discharge.   Marvene Fleischer, Care Management Assistant

## 2022-04-19 NOTE — ED NOTES
TRANSITION OF CARE - SBAR OUT    Patient is being transferred to [facility] 2 [facility] Cardio, Room# 2164. Report GIVEN TO JESUS platt on Kathy Angry for routine post - op. Report is consisted of the following information SBAR, Kardex, ED Summary, Intake/Output, MAR and Cardiac Rhythm SR. Patient transferred to receiving unit by: Mimi RN (RN or Tech Name)     [place] Means outstanding consults: Yes cardiology contacted by Dr Pratik Bernardo routine labs: No  yes trop pending    All current orders reviewed with accepting nurse: Yes    The following personal items will be sent with the patient during transfer to the floor:   All valuables:      Visual Aid: None                   CARDIAC MONITORING ORDERED: Yes SR    The following CURRENT information were reported to the receiving RN:    CODE STATUS: Full Code    NIH SCORE:    CHELSIE SCREENING: Swallow Screening  Is the Patient Unable to Remain Alert for Testing?: No (04/18/22 1911)  Is the Patient on a Modified Diet (Thickened Liquids) Due to Pre-existing Dysphagia?: No (04/18/22 1911)  Is There Presence of Existing Enteral Tube Feeding via the Stomach or Nose?: No (04/18/22 1911)  Is There Presence of Head-of-Bed Restrictions (Less than 30 Degrees)?: No (04/18/22 1911)  Is There Presence of Tracheotomy Tube?: No (04/18/22 1911)  Is the Patient Ordered Nothing-by-Mouth Status?: No (04/18/22 1911)  3 oz Water Swallow Screen: Pass (04/18/22 1911)    NEURO ASSESSMENT:        RESTRAINTS IN USE: No      IS DOCUMENTATION COMPLETE: Yes      Vital Signs  Level of Consciousness: Alert (0) (04/18/22 1723)  Temp: 97.9 °F (36.6 °C) (04/18/22 1723)  Temp Source: Oral (04/18/22 1723)  Pulse (Heart Rate): 65 (04/18/22 2033)  Resp Rate: 14 (04/18/22 2033)  BP: (!) 149/68 (04/18/22 2033)  MAP (Monitor): 92 (04/18/22 2033)  MAP (Calculated): 95 (04/18/22 2033)  BP 1 Location: Left upper arm (04/18/22 1723)  MEWS Score: 1 (04/18/22 1723)  Pain 1  Pain Scale 1: Numeric (0 - 10) (04/18/22 2000)  Pain Intensity 1: 7 (04/18/22 2000)  Pain Location 1: Chest (04/18/22 2000)  Pain Description 1: Aching (04/18/22 2000)  Pain Intervention(s) 1: Medication (see MAR) (04/18/22 2000)      OXYGEN: Oxygen Therapy  O2 Device: None (Room air) (04/18/22 1723)    SIGRID FALL RISK:        WOUNDS: No  NA    URINARY CATHETER: voiding    LINE ACCESS:   Peripheral IV 04/18/22 Right Antecubital (Active)        Opportunity for questions and clarification were provided.   Rashaad Suero

## 2022-04-19 NOTE — DISCHARGE INSTRUCTIONS
Patient Discharge Instructions    Holland Worthington / [id number] : 1950    Admitted 2022 Discharged: 2022         DISCHARGE DIAGNOSIS:       Chest pain (2022) likely musculoskeletal or esophageal            What to do at Home    1. Recommended diet: Resume previous diet    2. Recommended activity: Activity as tolerated    3. If you experience any of the following symptoms then please call your primary care physician or return to the emergency room if you cannot get hold of your doctor:   Fevers > 100.5, chills   Nausea or vomiting, persistent diarrhea > 24 hours   Blood in stool or black stools   Chest pain or SOB      Follow-up Information     Follow up With Specialties Details Why Contact Info    Oly Basurto NP Nurse Practitioner Schedule an appointment as soon as possible for a visit in 3 days  [address]  [phone]          Use protonix twice per day for 14 days then daily for 14 days to see if it helps the pain    Use motrin OTC as needed for the pain per package instructions      Information obtained by :  I understand that if any problems occur once I am at home I am to contact my physician. I understand and acknowledge receipt of the instructions indicated above.                                                                                                                                            Physician's or R.N.'s Signature                                                                  Date/Time                                                                                                                                              Patient or Representative Signature                                                          Date/Time

## 2022-04-19 NOTE — PROGRESS NOTES
Transition of Care Plan:    RUR:5% low risk   Disposition: home   Follow up appointments: PCP and cardiology   DME needed: none   Transportation at Discharge: spouse   Keys or means to access home:   Pt has access      IM Medicare Letter: recieved  Is patient a BCPI-A Bundle: N/a          If yes, was Bundle Letter given?:    Is patient a [place] and connected with the [place]? N/a              If yes, was [facility] transfer form completed and [facility] notified? Caregiver Contact:Blas Smith (Spouse)   [phone]  Discharge Caregiver contacted prior to discharge? Wife is at the bedside  Care Conference needed?:   None                           Reason for Admission:  Chest pain                      RUR Score:  5% low risk                    Plan for utilizing home health:   Not needed at this time        PCP: First and Last name:  Meek Lyle NP     Name of Practice:     Are you a current patient: Yes/No:  Yes     Approximate date of last visit: about a month ago   Can you participate in a virtual visit with your PCP:                     Current Advanced Directive/Advance Care Plan: Full Code     [address] (ACP) Conversation      Date of Conversation: 4/19/2022  Conducted with: Patient with Decision Making Capacity    Healthcare Decision Maker:   No healthcare decision makers have been documented. Click here to complete [address] including selection of the Healthcare Decision Maker Relationship (ie \"Primary\")      Content/Action Overview: Has ACP document(s) NOT on file - requested patient to provide  Reviewed DNR/DNI and patient elects DNR order - referred to ACP Clinical Specialist & placed order       Length of Voluntary ACP Conversation in minutes:  2                      Transition of Care Plan:      CM met with Pt and wife briefly at the bedside. Pt was alert and oriented x4 during the initial assessment.  Pt confirmed that demographic and information was correct on the chart. Pt is independent at baseline and drives. Care Management Interventions  PCP Verified by CM: Yes  Mode of Transport at Discharge:  Other (see comment) (spouse )  Transition of Care Consult (CM Consult): Discharge Planning  Discharge Durable Medical Equipment: No  Physical Therapy Consult: No  Occupational Therapy Consult: No  Speech Therapy Consult: No  Support Systems: Spouse/Significant Other  Confirm Follow Up Transport: Family  The Patient and/or Patient Representative was Provided with a Choice of Provider and Agrees with the Discharge Plan?: No  Freedom of Choice List was Provided with Basic Dialogue that Supports the Patient's Individualized Plan of Care/Goals, Treatment Preferences and Shares the Quality Data Associated with the Providers?: No  Discharge Location  Patient Expects to be Discharged to[de-identified] [address], [place], [address] [facility] [place]
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
## 2022-04-19 NOTE — CARDIO/PULMONARY
Cardiac Rehab Note: chart review/referral     Chest pain    Per cardiology note 4/19/22:  \"Chest pain appears to be atypical, with constant chest pain and negative cardiac enzymes - do not suspect ACS. Pain is also positional and possible reproducible. Has recent long distance travel. Would need to rule out PE, though less likely. Has recent cath. With atypical symptoms would not pursue any ischemic eval, echo pending. \"    Smoking history assessed. Patient is a non smoker.    Smoking Cessation Program link has not been added to the AVS.

## 2022-04-19 NOTE — H&P
Hospitalist Admission Note    NAME: Melissa Norman   :  1950   MRN:  [id number]     Date/Time:  2022 9:42 PM    Patient PCP: Fatimah Santiago, TASNEEM  ______________________________________________________________________  Given the patient's current clinical presentation, I have a high level of concern for decompensation if discharged from the emergency department. Complex decision making was performed, which includes reviewing the patient's available past medical records, laboratory results, and x-ray films. My assessment of this patient's clinical condition and my plan of care is as follows. Assessment / Plan:    Chest pain   Chest x-rayno acute abnormality.  Troponin 10, 10, will repeat 1 more than the morning.  N.p.o. after midnight. Chest wall tenderness on palpation   Got aspirin in the ER along with nitroglycerin and fentanyl.  Catheterization in 2021 shows nonobstructive CAD, normal LV function.  Cardiology consulted.  Aspirin 81 mg daily.  Continue statin.  Lipid panel in AM.    Acute kidney injury   Creatinine 1.33, giving IV fluids.  Repeat check in AM.    Hypertension   Continue the home medications. Code Status: Full code  Surrogate Decision Maker:    DVT Prophylaxis: Lovenox  GI Prophylaxis: not indicated    Baseline: Ambulatory at home      Subjective:   CHIEF COMPLAINT: Chest pain    HISTORY OF PRESENT ILLNESS:     Eric Roldan is a 66 y.o.  male who presents with chest pain that started today in the morning. Patient past medical history of hypertension, hyperlipidemia. Patient states that today when he was on vacation he started having chest pain located midsternally radiating to the left side of the chest.  Patient took some nitroglycerin which relieved some pain but the pain was still present. Patient called the doctor who advised to go to the ER for further evaluation.   The patient is feeling much better now, chest pain much better only slight in intensity after getting the fentanyl and nitroglycerin. Patient had a cardiac aspiration done in June 2021 which shows nonobstructive CAD. Does not smoke, does not drink alcohol, no substance abuse. We were asked to admit for work up and evaluation of the above problems. Past Medical History:   Diagnosis Date    Cancer [facility])     prostate x 2 - 2008/2016 - prostate removed 2008 and radiation 2017    Hypertension     Ill-defined condition     increased cholesterol        Past Surgical History:   Procedure Laterality Date    COLONOSCOPY N/A 10/10/2018    COLONOSCOPY performed by Jamal Berry MD at [facility] ENDOSCOPY    HX [facility] HX ORTHOPAEDIC Left     for torn meniscus    HX PROSTATECTOMY  2008       Social History     Tobacco Use    Smoking status: Never Smoker    Smokeless tobacco: Never Used   Substance Use Topics    Alcohol use: No        Family History   Problem Relation Age of Onset    Cancer Father         prostate    Mult Sclerosis Sister     Cancer Brother         ? where    Alcohol abuse Brother     Other Sister         \"something in her pancreas\"     No Known Allergies     Prior to Admission medications    Medication Sig Start Date End Date Taking? Authorizing Provider   aspirin 81 mg chewable tablet Take 81 mg by mouth daily. Provider, Historical   nitroglycerin (NITROSTAT) 0.3 mg SL tablet by SubLINGual route every five (5) minutes as needed for Chest Pain. Provider, Historical   multivitamin (ONE A DAY) tablet Take 1 Tablet by mouth daily. Provider, Historical   amLODIPine (NORVASC) 5 mg tablet Take 2 Tablets by mouth daily. 6/11/21   Lori Floyd MD   pantoprazole (Protonix) 40 mg tablet Take 1 Tablet by mouth daily. 6/11/21   Lori Floyd MD   atorvastatin (LIPITOR) 20 mg tablet Take 20 mg by mouth daily. Provider, Historical   albuterol sulfate (VENTOLIN HFA IN) Take 2 Puffs by inhalation.     Provider, Historical       REVIEW OF SYSTEMS:     I am not able to complete the review of systems because: The patient is intubated and sedated    The patient has altered mental status due to his acute medical problems    The patient has baseline aphasia from prior stroke(s)    The patient has baseline dementia and is not reliable historian    The patient is in acute medical distress and unable to provide information           Total of 12 systems reviewed as follows:       POSITIVE= underlined text  Negative = text not underlined  General:  fever, chills, sweats, generalized weakness, weight loss/gain,      loss of appetite   Eyes:    blurred vision, eye pain, loss of vision, double vision  ENT:    rhinorrhea, pharyngitis   Respiratory:   cough, sputum production, SOB, ZAMUDIO, wheezing, pleuritic pain   Cardiology:   chest pain, palpitations, orthopnea, PND, edema, syncope   Gastrointestinal:  abdominal pain , N/V, diarrhea, dysphagia, constipation, bleeding   Genitourinary:  frequency, urgency, dysuria, hematuria, incontinence   Muskuloskeletal :  arthralgia, myalgia, back pain  Hematology:  easy bruising, nose or gum bleeding, lymphadenopathy   Dermatological: rash, ulceration, pruritis, color change / jaundice  Endocrine:   hot flashes or polydipsia   Neurological:  headache, dizziness, confusion, focal weakness, paresthesia,     Speech difficulties, memory loss, gait difficulty  Psychological: Feelings of anxiety, depression, agitation    Objective:   VITALS:    Visit Vitals  BP (!) 149/68   Pulse 65   Temp 97.9 °F (36.6 °C)   Resp 14   Ht 5' 8\" (1.727 m)   Wt 81 kg (178 lb 9.2 oz)   SpO2 96%   BMI 27.15 kg/m²       PHYSICAL EXAM:    General:    Alert, cooperative, no distress, appears stated age. HEENT: Atraumatic, anicteric sclerae, pink conjunctivae     No oral ulcers, mucosa moist, throat clear, dentition fair  Neck:  Supple, symmetrical,  thyroid: non tender  Lungs:   Clear to auscultation bilaterally.   No Wheezing or Rhonchi. No rales. Chest wall:  Left-sided chest wall tenderness on palpation, no Accessory muscle use. Heart:   Regular  rhythm,  No  murmur   No edema  Abdomen:   Soft, non-tender. Not distended. Bowel sounds normal  Extremities: No cyanosis. No clubbing,      Skin turgor normal, Capillary refill normal, Radial dial pulse 2+  Skin:     Not pale. Not Jaundiced  No rashes   Psych:  Good insight. Not depressed. Not anxious or agitated. Neurologic: EOMs intact. No facial asymmetry. No aphasia or slurred speech. Symmetrical strength, Sensation grossly intact. Alert and oriented X 4.     _______________________________________________________________________  Care Plan discussed with:    Comments   Patient x    Family      RN x    Care Manager                    Consultant:  x    _______________________________________________________________________  Expected  Disposition:   Home with Family x   HH/PT/OT/RN    SNF/LTC    EDGAR    ________________________________________________________________________  TOTAL TIME: 61 Minutes    Critical Care Provided     Minutes non procedure based      Comments     Reviewed previous records   >50% of visit spent in counseling and coordination of care  Discussion with patient and/or family and questions answered       ________________________________________________________________________  Signed: Margarita Mercado MD    Procedures: see electronic medical records for all procedures/Xrays and details which were not copied into this note but were reviewed prior to creation of Plan.     LAB DATA REVIEWED:    Recent Results (from the past 24 hour(s))   EKG, 12 LEAD, INITIAL    Collection Time: 04/18/22  5:26 PM   Result Value Ref Range    Ventricular Rate 81 BPM    Atrial Rate 81 BPM    P-R Interval 174 ms    QRS Duration 86 ms    Q-T Interval 366 ms    QTC Calculation (Bezet) 425 ms    Calculated P Axis 74 degrees    Calculated R Axis 76 degrees    Calculated T Axis 13 degrees    Diagnosis       Sinus rhythm with occasional premature ventricular complexes  Moderate voltage criteria for LVH, may be normal variant  Nonspecific ST and T wave abnormality  No previous ECGs available     CBC WITH AUTOMATED DIFF    Collection Time: 04/18/22  5:33 PM   Result Value Ref Range    WBC 12.1 (H) 4.1 - 11.1 K/uL    RBC 4.64 4.10 - 5.70 M/uL    HGB 12.5 12.1 - 17.0 g/dL    HCT 39.3 36.6 - 50.3 %    MCV 84.7 80.0 - 99.0 FL    MCH 26.9 26.0 - 34.0 PG    MCHC 31.8 30.0 - 36.5 g/dL    RDW 14.7 (H) 11.5 - 14.5 %    PLATELET 851 415 - 219 K/uL    MPV 11.2 8.9 - 12.9 FL    NRBC 0.0 0  WBC    ABSOLUTE NRBC 0.00 0.00 - 0.01 K/uL    NEUTROPHILS 71 32 - 75 %    LYMPHOCYTES 20 12 - 49 %    MONOCYTES 8 5 - 13 %    EOSINOPHILS 1 0 - 7 %    BASOPHILS 0 0 - 1 %    IMMATURE GRANULOCYTES 0 0.0 - 0.5 %    ABS. NEUTROPHILS 8.6 (H) 1.8 - 8.0 K/UL    ABS. LYMPHOCYTES 2.4 0.8 - 3.5 K/UL    ABS. MONOCYTES 0.9 0.0 - 1.0 K/UL    ABS. EOSINOPHILS 0.1 0.0 - 0.4 K/UL    ABS. BASOPHILS 0.1 0.0 - 0.1 K/UL    ABS. IMM. GRANS. 0.0 0.00 - 0.04 K/UL    DF AUTOMATED     METABOLIC PANEL, COMPREHENSIVE    Collection Time: 04/18/22  5:33 PM   Result Value Ref Range    Sodium 139 136 - 145 mmol/L    Potassium 4.1 3.5 - 5.1 mmol/L    Chloride 106 97 - 108 mmol/L    CO2 30 21 - 32 mmol/L    Anion gap 3 (L) 5 - 15 mmol/L    Glucose 100 65 - 100 mg/dL    BUN 16 6 - 20 MG/DL    Creatinine 1.33 (H) 0.70 - 1.30 MG/DL    BUN/Creatinine ratio 12 12 - 20      GFR est AA >60 >60 ml/min/1.73m2    GFR est non-AA 53 (L) >60 ml/min/1.73m2    Calcium 9.0 8.5 - 10.1 MG/DL    Bilirubin, total 0.3 0.2 - 1.0 MG/DL    ALT (SGPT) 25 12 - 78 U/L    AST (SGOT) 22 15 - 37 U/L    Alk.  phosphatase 87 45 - 117 U/L    Protein, total 7.7 6.4 - 8.2 g/dL    Albumin 3.5 3.5 - 5.0 g/dL    Globulin 4.2 (H) 2.0 - 4.0 g/dL    A-G Ratio 0.8 (L) 1.1 - 2.2     NT-PRO BNP    Collection Time: 04/18/22  5:33 PM   Result Value Ref Range    NT pro-BNP 53 <125 PG/ML TROPONIN-HIGH SENSITIVITY    Collection Time: 04/18/22  5:33 PM   Result Value Ref Range    Troponin-High Sensitivity 10 0 - 76 ng/L   TROPONIN-HIGH SENSITIVITY    Collection Time: 04/18/22  9:00 PM   Result Value Ref Range    Troponin-High Sensitivity 10 0 - 76 ng/L

## 2022-04-19 NOTE — ED NOTES
HISTORY OF PRESENT ILLNESS:  Gerber Quiros is a 69 RN attempted to call report, floor not available No Effusion. No atrophy. Positive TTP Medial Joint Line. No TTP Lateral Joint Line. No TTP Patella. No TTP Patellar Tendon. No Para-patellar TTP. No Pes Anserine Bursa TTP. No Popliteal Fossa TTP. AROM: 5 - 100. Negative Lachman. Negative Seated Anterior Drawer. Negative Posterior Drawer. Negative Bridgett's Medial. Negative Bridgett's Lateral. No Varus Pain or Laxity in Flexion or Extension. No Valgus Pain or Laxity in Flexion or Extension. Calf Soft, NT. NVI distally.    IMAGING & TESTING:   Bilateral PA Standing, Bilateral Patellar Sunrise and Lateral of the RIGHT and LEFT Knee (6/5/19): Varus alignment of the RIGHT tibiofemoral with Medial Compartment bone-on-bone degenerative change. Degenerative changes noted in BOTH patellofemoral joints, with patella relatively centrally located. No fracture or dislocation. No evidence of loose body.    PROCEDURE:  L Inj/Asp: R knee on 7/2/2019 10:16 AM  Indications: pain  Details: 22 G needle, anterolateral approach  Medications: 20 mg sodium hyaluronATE 20 MG/2ML  Outcome: tolerated well, no immediate complications    MN  Procedure, treatment alternatives, risks and benefits explained, specific risks discussed. Consent was given by the patient. Immediately prior to procedure a time out was called to verify the correct patient, procedure, equipment, support staff and site/side marked as required. Patient was prepped and draped in the usual sterile fashion.       ASSESSMENT & PLAN:  Gerber Quiros is a 69 year old male Osteoarthritis; RIGHT Knee.     Plan as follows:  1. Discussed Viscosupplementation and Euflexxa specifically  2. He wished to proceed and tolerated Euflexxa #3 injection well.   3. Ice following the injection.   4. Follow-up as needed    Vitor Tanner MD  06/26/19

## 2022-04-19 NOTE — PROGRESS NOTES
Problem: Falls - Risk of  Goal: *Absence of Falls  Description: Document Becky Marie Fall Risk and appropriate interventions in the flowsheet.   Outcome: Progressing Towards Goal  Note: Fall Risk Interventions:  Mobility Interventions: Communicate number of staff needed for ambulation/transfer,Patient to call before getting OOB              Elimination Interventions: Call light in reach,Patient to call for help with toileting needs

## 2022-04-19 NOTE — DISCHARGE SUMMARY
Hospitalist Discharge Note    NAME:  Ivory Rascon   :   1950   MRN:   [id number]       Admit date: 2022    Discharge Date: 2022    PCP: Leila Mari NP    Discharge Diagnoses:    Chest pain likely musculoskeletal POA     Acute kidney injury POA resolved with IVF     Essential Hypertension POA     Code Status: Full code      Current Discharge Medication List      CONTINUE these medications which have CHANGED    Details   pantoprazole (Protonix) 40 mg tablet protonix 40 mg twice per day x 14 days then daily x 2 weeks  Qty: 30 Tablet, Refills: 2         CONTINUE these medications which have NOT CHANGED    Details   aspirin 81 mg chewable tablet Take 81 mg by mouth daily. nitroglycerin (NITROSTAT) 0.3 mg SL tablet by SubLINGual route every five (5) minutes as needed for Chest Pain.      multivitamin (ONE A DAY) tablet Take 1 Tablet by mouth daily. amLODIPine (NORVASC) 5 mg tablet Take 2 Tablets by mouth daily. Qty: 30 Tablet, Refills: 5      atorvastatin (LIPITOR) 20 mg tablet Take 20 mg by mouth daily. albuterol sulfate (VENTOLIN HFA IN) Take 2 Puffs by inhalation. Follow-up Information     Follow up With Specialties Details Why Contact Info    Leila Mari NP Nurse Practitioner Schedule an appointment as soon as possible for a visit in 3 days  [address]  [phone]            Time spent on discharge:   I spent greater than 30 minutes on discharge, seeing and examining the patient and reviewing labs, reconciling home meds and new meds, coordinating care with case management, doing the discharge papers and the D/C summary    Discharge disposition: home    Discharge Condition: Stable      Summary of admission H+P(copied from Dr Gregoria Delcid Note):    CHIEF COMPLAINT: Chest pain     HISTORY OF PRESENT ILLNESS:     Guillermo Horvath is a 66 y.o.  male who presents with chest pain that started today in the morning.   Patient past medical history of hypertension, hyperlipidemia. Patient states that today when he was on vacation he started having chest pain located midsternally radiating to the left side of the chest.  Patient took some nitroglycerin which relieved some pain but the pain was still present. Patient called the doctor who advised to go to the ER for further evaluation. The patient is feeling much better now, chest pain much better only slight in intensity after getting the fentanyl and nitroglycerin. Patient had a cardiac aspiration done in June 2021 which shows nonobstructive CAD. Does not smoke, does not drink alcohol, no substance abuse.     We were asked to admit for work up and evaluation of the above problems.           Past Medical History:   Diagnosis Date    Cancer [facility])       prostate x 2 - 2008/2016 - prostate removed 2008 and radiation 2017    Hypertension      Ill-defined condition       increased cholesterol      pCXR read by radiology FINDINGS:  A portable AP radiograph of the chest was obtained at 19:06 hours. The patient  is on a cardiac monitor. The lungs are clear. The cardiac and mediastinal  contours and pulmonary vascularity are normal.  The bones and soft tissues are  unremarkable. IMPRESSION  No acute process. CTA chest read by radiology FINDINGS:  CHEST WALL: No chest wall mass or axillary lymphadenopathy. THYROID: No nodule. MEDIASTINUM: No mass or lymphadenopathy. RG: No mass or lymphadenopathy. THORACIC AORTA: No dissection or aneurysm. PULMONARY ARTERIES: Main pulmonary artery is normal in caliber. No evidence of  acute pulmonary emboli. TRACHEA/BRONCHI: Patent. ESOPHAGUS: No wall thickening or dilatation. HEART: Normal in size. PLEURA: No effusion or pneumothorax. LUNGS: 4 mm right middle lobe pulmonary nodule (series 2, image 68). 2 mm left  lower lobe pulmonary nodule (2/58). INCIDENTALLY IMAGED UPPER ABDOMEN: No focal abnormality.   BONES: No destructive bone lesion. ADDITIONAL COMMENTS: N/A  IMPRESSION  No evidence of acute pulmonary embolus. Tiny pulmonary nodules measuring up to 4  mm; if there is a cancer or smoking history, consider follow-up CT in 12 months  to assure stability. Echo TTE read by cardiology  Left Ventricle Left ventricle size is normal. Increased wall thickness. Increased ventricular mass. Normal wall motion. The EF by visual approximation is 55 - 60%. Normal diastolic function. Left Atrium Left atrium is mildly dilated. Right Ventricle Right ventricle size is normal. Normal systolic function. Right Atrium Right atrium size is normal.   Aortic Valve Valve structure is normal. No regurgitation. Mitral Valve Valve structure is normal. No transvalvular regurgitation. No stenosis noted. Tricuspid Valve Valve structure is normal. Trace transvalvular regurgitation. No stenosis noted. Pulmonic Valve Valve structure is normal. No transvalvular regurgitation. No stenosis noted. Aorta Normal sized ascending aorta and aorta. IVC/Hepatic Veins IVC diameter is less than or equal to 21 mm and decreases greater than 50% during inspiration; therefore the estimated right atrial pressure is normal (~3 mmHg). IVC size is normal.   Pericardium No pericardial effusion. pCXR read by radiology FINDINGS:  A portable AP radiograph of the chest was obtained at 19:06 hours. The patient  is on a cardiac monitor. The lungs are clear. The cardiac and mediastinal  contours and pulmonary vascularity are normal.  The bones and soft tissues are  unremarkable. IMPRESSION  No acute process. CTA chest read by radiology FINDINGS:  CHEST WALL: No chest wall mass or axillary lymphadenopathy. THYROID: No nodule. MEDIASTINUM: No mass or lymphadenopathy. RG: No mass or lymphadenopathy. THORACIC AORTA: No dissection or aneurysm. PULMONARY ARTERIES: Main pulmonary artery is normal in caliber. No evidence of  acute pulmonary emboli.   TRACHEA/BRONCHI: Patent. ESOPHAGUS: No wall thickening or dilatation. HEART: Normal in size. PLEURA: No effusion or pneumothorax. LUNGS: 4 mm right middle lobe pulmonary nodule (series 2, image 68). 2 mm left  lower lobe pulmonary nodule (2/58). INCIDENTALLY IMAGED UPPER ABDOMEN: No focal abnormality. BONES: No destructive bone lesion. ADDITIONAL COMMENTS: N/A  IMPRESSION  No evidence of acute pulmonary embolus. Tiny pulmonary nodules measuring up to 4  mm; if there is a cancer or smoking history, consider follow-up CT in 12 months  to assure stability. Echo TTE read by radiology   Left Ventricle Left ventricle size is normal. Increased wall thickness. Increased ventricular mass. Normal wall motion. The EF by visual approximation is 55 - 60%. Normal diastolic function. Left Atrium Left atrium is mildly dilated. Right Ventricle Right ventricle size is normal. Normal systolic function. Right Atrium Right atrium size is normal.   Aortic Valve Valve structure is normal. No regurgitation. Mitral Valve Valve structure is normal. No transvalvular regurgitation. No stenosis noted. Tricuspid Valve Valve structure is normal. Trace transvalvular regurgitation. No stenosis noted. Pulmonic Valve Valve structure is normal. No transvalvular regurgitation. No stenosis noted. Aorta Normal sized ascending aorta and aorta. IVC/Hepatic Veins IVC diameter is less than or equal to 21 mm and decreases greater than 50% during inspiration; therefore the estimated right atrial pressure is normal (~3 mmHg). IVC size is normal.   Pericardium No pericardial effusion. Hospital course:     Chest pain likely musculoskeletal or GERD POA   Chest x-ray with no acute abnormality.  Troponin 10, 10, 10   Chest wall tenderness on palpation   ASA and NTG   Catheterization in June 2021 shows nonobstructive CAD, normal LV function.    Cardiology consulted Dr George Echeverria saw  - CTA chest negative for PE  - Echo LVEF 55-60%   Aspirin 81 mg daily.  Continue statin. - Symptom control, pain felt musculoskeletal or GERD  - Trial of PPI     Acute kidney injury   Creatinine 1.33, giving IV fluids.  improved with IVF, creatinine 1.04     Essential Hypertension POA   Continue the home medications.     Code Status: Full code  Surrogate Decision Maker:     DVT Prophylaxis: Lovenox  GI Prophylaxis: not indicated     Baseline: Ambulatory at home      Daily note:     Chief Complaint:    \"My chest is a bit sore\"  No other complaints, no SOB    Review of Systems:  Fever/chills N   Cough N   SOB/ZAMUDIO  N   Chest Pain N   Abdominal Pain N   Diarrhea N   Nausea/Vomiting N   Headache N        Yes Past Med History and Social history reviewed. No changes. Yes Current Medication list and allergies reviewed*    Objective:   Vitals:  Patient Vitals for the past 24 hrs:   Temp Pulse Resp BP SpO2   22 1137 98.1 °F (36.7 °C) (!) 56 16 (!) 144/58 100 %   22 1112    (!) 147/61    22 0732 97.8 °F (36.6 °C) 66 16 (!) 147/61 99 %   22 0441  60  139/61    22 0354 98.4 °F (36.9 °C) 66 18 138/65    22 2327 97.9 °F (36.6 °C) 64 16 (!) 142/65    22 2149 98 °F (36.7 °C) 65 18 (!) 148/68    22 2033  65 14 (!) 149/68 96 %   22 2011  79 16  97 %   22  76  (!) 156/71    22 1956  75 18 (!) 156/71 96 %   22 1941  86 21  97 %   22 1930  72 18 (!) 154/68 97 %   22 1723 97.9 °F (36.6 °C) 90 16 (!) 173/74 99 %     Temp (24hrs), Av °F (36.7 °C), Min:97.8 °F (36.6 °C), Max:98.4 °F (36.9 °C)        PHYSICAL EXAM:  General:          Alert, no distress.    Neck:  No meningismus No masses  Lungs:             No accessory muscle use or retractions     Clear BS bilaterally  Heart:              Regular rate and rhythm,  No murmurs No Gallops. No LE edema  Abdomen:      Soft, non-tender.  Not distended.  Bowel sounds normal.      Skin:               No rashes  Neurologic:      Alert and oriented X 3    Motor exam nonfocal    ___________________________________________________    Hospitalist: Jaquan Escamilla, MD     ___________________________________________________    **Lab Data Reviewed:  Recent Results (from the past 24 hour(s))   EKG, 12 LEAD, INITIAL    Collection Time: 04/18/22  5:26 PM   Result Value Ref Range    Ventricular Rate 81 BPM    Atrial Rate 81 BPM    P-R Interval 174 ms    QRS Duration 86 ms    Q-T Interval 366 ms    QTC Calculation (Bezet) 425 ms    Calculated P Axis 74 degrees    Calculated R Axis 76 degrees    Calculated T Axis 13 degrees    Diagnosis       Sinus rhythm with occasional premature ventricular complexes  Moderate voltage criteria for LVH, may be normal variant  Nonspecific ST and T wave abnormality  No previous ECGs available     CBC WITH AUTOMATED DIFF    Collection Time: 04/18/22  5:33 PM   Result Value Ref Range    WBC 12.1 (H) 4.1 - 11.1 K/uL    RBC 4.64 4.10 - 5.70 M/uL    HGB 12.5 12.1 - 17.0 g/dL    HCT 39.3 36.6 - 50.3 %    MCV 84.7 80.0 - 99.0 FL    MCH 26.9 26.0 - 34.0 PG    MCHC 31.8 30.0 - 36.5 g/dL    RDW 14.7 (H) 11.5 - 14.5 %    PLATELET 046 891 - 253 K/uL    MPV 11.2 8.9 - 12.9 FL    NRBC 0.0 0  WBC    ABSOLUTE NRBC 0.00 0.00 - 0.01 K/uL    NEUTROPHILS 71 32 - 75 %    LYMPHOCYTES 20 12 - 49 %    MONOCYTES 8 5 - 13 %    EOSINOPHILS 1 0 - 7 %    BASOPHILS 0 0 - 1 %    IMMATURE GRANULOCYTES 0 0.0 - 0.5 %    ABS. NEUTROPHILS 8.6 (H) 1.8 - 8.0 K/UL    ABS. LYMPHOCYTES 2.4 0.8 - 3.5 K/UL    ABS. MONOCYTES 0.9 0.0 - 1.0 K/UL    ABS. EOSINOPHILS 0.1 0.0 - 0.4 K/UL    ABS. BASOPHILS 0.1 0.0 - 0.1 K/UL    ABS. IMM.  GRANS. 0.0 0.00 - 0.04 K/UL    DF AUTOMATED     METABOLIC PANEL, COMPREHENSIVE    Collection Time: 04/18/22  5:33 PM   Result Value Ref Range    Sodium 139 136 - 145 mmol/L    Potassium 4.1 3.5 - 5.1 mmol/L    Chloride 106 97 - 108 mmol/L    CO2 30 21 - 32 mmol/L    Anion gap 3 (L) 5 - 15 mmol/L    Glucose 100 65 - 100 mg/dL    BUN 16 6 - 20 MG/DL    Creatinine 1.33 (H) 0.70 - 1.30 MG/DL    BUN/Creatinine ratio 12 12 - 20      GFR est AA >60 >60 ml/min/1.73m2    GFR est non-AA 53 (L) >60 ml/min/1.73m2    Calcium 9.0 8.5 - 10.1 MG/DL    Bilirubin, total 0.3 0.2 - 1.0 MG/DL    ALT (SGPT) 25 12 - 78 U/L    AST (SGOT) 22 15 - 37 U/L    Alk.  phosphatase 87 45 - 117 U/L    Protein, total 7.7 6.4 - 8.2 g/dL    Albumin 3.5 3.5 - 5.0 g/dL    Globulin 4.2 (H) 2.0 - 4.0 g/dL    A-G Ratio 0.8 (L) 1.1 - 2.2     NT-PRO BNP    Collection Time: 04/18/22  5:33 PM   Result Value Ref Range    NT pro-BNP 53 <125 PG/ML   TROPONIN-HIGH SENSITIVITY    Collection Time: 04/18/22  5:33 PM   Result Value Ref Range    Troponin-High Sensitivity 10 0 - 76 ng/L   TROPONIN-HIGH SENSITIVITY    Collection Time: 04/18/22  9:00 PM   Result Value Ref Range    Troponin-High Sensitivity 10 0 - 76 ng/L   EKG, 12 LEAD, SUBSEQUENT    Collection Time: 04/19/22  3:47 AM   Result Value Ref Range    Ventricular Rate 58 BPM    Atrial Rate 58 BPM    P-R Interval 204 ms    QRS Duration 92 ms    Q-T Interval 448 ms    QTC Calculation (Bezet) 439 ms    Calculated P Axis 64 degrees    Calculated R Axis 53 degrees    Diagnosis       Sinus bradycardia  Nonspecific T wave abnormality  Abnormal ECG  When compared with ECG of 18-APR-2022 17:26,  MANUAL COMPARISON REQUIRED, DATA IS UNCONFIRMED     METABOLIC PANEL, BASIC    Collection Time: 04/19/22  4:29 AM   Result Value Ref Range    Sodium 142 136 - 145 mmol/L    Potassium 3.8 3.5 - 5.1 mmol/L    Chloride 110 (H) 97 - 108 mmol/L    CO2 25 21 - 32 mmol/L    Anion gap 7 5 - 15 mmol/L    Glucose 93 65 - 100 mg/dL    BUN 15 6 - 20 MG/DL    Creatinine 1.04 0.70 - 1.30 MG/DL    BUN/Creatinine ratio 14 12 - 20      GFR est AA >60 >60 ml/min/1.73m2    GFR est non-AA >60 >60 ml/min/1.73m2    Calcium 8.4 (L) 8.5 - 10.1 MG/DL   MAGNESIUM    Collection Time: 04/19/22  4:29 AM   Result Value Ref Range    Magnesium 2.2 1.6 - 2.4 mg/dL   CBC W/O DIFF Collection Time: 04/19/22  4:29 AM   Result Value Ref Range    WBC 9.9 4.1 - 11.1 K/uL    RBC 4.03 (L) 4.10 - 5.70 M/uL    HGB 10.8 (L) 12.1 - 17.0 g/dL    HCT 34.4 (L) 36.6 - 50.3 %    MCV 85.4 80.0 - 99.0 FL    MCH 26.8 26.0 - 34.0 PG    MCHC 31.4 30.0 - 36.5 g/dL    RDW 14.8 (H) 11.5 - 14.5 %    PLATELET 799 428 - 238 K/uL    MPV 11.7 8.9 - 12.9 FL    NRBC 0.0 0  WBC    ABSOLUTE NRBC 0.00 0.00 - 0.01 K/uL   TROPONIN-HIGH SENSITIVITY    Collection Time: 04/19/22  4:29 AM   Result Value Ref Range    Troponin-High Sensitivity 10 0 - 76 ng/L   ECHO ADULT COMPLETE    Collection Time: 04/19/22 11:30 AM   Result Value Ref Range    IVSd 1.5 (A) 0.6 - 1.0 cm    LVIDd 3.8 (A) 4.2 - 5.9 cm    LVIDs 2.6 cm    LVOT Diameter 2.1 cm    LVPWd 1.5 (A) 0.6 - 1.0 cm    LVOT Peak Gradient 3 mmHg    LVOT Peak Gradient 3 mmHg    LVOT Mean Gradient 2 mmHg    LVOT SV 70.3 ml    LVOT Peak Velocity 0.9 m/s    LVOT Peak Velocity 0.9 m/s    LVOT VTI 20.3 cm    RVIDd 4.2 cm    RVSP 13 mmHg    RV Free Wall Peak S' 14 cm/s    LA Diameter 3.4 cm    LA Volume A/L 75 mL    LA Volume 2C 59 (A) 18 - 58 mL    LA Volume 4C 69 (A) 18 - 58 mL    LA Volume BP 64 (A) 18 - 58 mL    Est. RA Pressure 3 mmHg    AV Cusp Mmode 2.0 cm    AV Area by Peak Velocity 3.3 cm2    AV Area by Peak Velocity 3.3 cm2    AV Area by Peak Velocity 3.3 cm2    AV Area by Peak Velocity 3.2 cm2    AV Area by VTI 3.3 cm2    AV Peak Gradient 4 mmHg    AV Peak Gradient 4 mmHg    AV Mean Gradient 2 mmHg    AV Peak Velocity 1.0 m/s    AV Peak Velocity 1.0 m/s    AV Mean Velocity 0.7 m/s    AV VTI 21.9 cm    MV A Velocity 0.85 m/s    MV E Wave Deceleration Time 191.1 ms    MV E Velocity 0.65 m/s    LV E' Lateral Velocity 12 cm/s    LV E' Septal Velocity 6 cm/s    Pulmonary Artery EDP 4 mmHg    OK Max Velocity 1.0 m/s    PV Peak Gradient 5 mmHg    PV Max Velocity 1.1 m/s    TR Peak Gradient 11 mmHg    TR Max Velocity 1.62 m/s    Ascending Aorta 3.2 cm    Fractional Shortening 2D 32 28 - 44 %    LVIDd Index 1.95 cm/m2    LVIDs Index 1.33 cm/m2    LV RWT Ratio 0.79     LV Mass 2D 216.6 88 - 224 g    LV Mass 2D Index 111.1 49 - 115 g/m2    MV E/A 0.76     E/E' Ratio (Averaged) 8.13     E/E' Lateral 5.42     E/E' Septal 10.83     LA Volume Index BP 33 16 - 34 ml/m2    LA Volume Index A/L 38 16 - 34 mL/m2    LVOT Stroke Volume Index 36.0 mL/m2    LVOT Area 3.5 cm2    LA Volume Index 2C 30 16 - 34 mL/m2    LA Volume Index 4C 35 (A) 16 - 34 mL/m2    LA Size Index 1.74 cm/m2    Ascending Aorta Index 1.64 cm/m2    LVOT:AV VTI Index 0.93     JIMENA/BSA VTI 1.7 cm2/m2

## 2022-04-20 LAB
ATRIAL RATE: 58 BPM
ATRIAL RATE: 81 BPM
CALCULATED P AXIS, ECG09: 64 DEGREES
CALCULATED P AXIS, ECG09: 74 DEGREES
CALCULATED R AXIS, ECG10: 53 DEGREES
CALCULATED R AXIS, ECG10: 76 DEGREES
CALCULATED T AXIS, ECG11: 13 DEGREES
DIAGNOSIS, 93000: NORMAL
DIAGNOSIS, 93000: NORMAL
P-R INTERVAL, ECG05: 174 MS
P-R INTERVAL, ECG05: 204 MS
Q-T INTERVAL, ECG07: 366 MS
Q-T INTERVAL, ECG07: 448 MS
QRS DURATION, ECG06: 86 MS
QRS DURATION, ECG06: 92 MS
QTC CALCULATION (BEZET), ECG08: 425 MS
QTC CALCULATION (BEZET), ECG08: 439 MS
VENTRICULAR RATE, ECG03: 58 BPM
VENTRICULAR RATE, ECG03: 81 BPM

## 2023-05-19 ENCOUNTER — TRANSCRIBE ORDERS (OUTPATIENT)
Facility: HOSPITAL | Age: 73
End: 2023-05-19

## 2023-05-19 DIAGNOSIS — R20.2 PARESTHESIA: Primary | ICD-10-CM

## 2023-05-30 ENCOUNTER — TRANSCRIBE ORDERS (OUTPATIENT)
Facility: HOSPITAL | Age: 73
End: 2023-05-30

## 2023-05-30 DIAGNOSIS — M54.2 CERVICAL SPINE PAIN: Primary | ICD-10-CM

## 2023-05-30 DIAGNOSIS — M54.2 CERVICALGIA: Primary | ICD-10-CM

## 2023-06-08 ENCOUNTER — HOSPITAL ENCOUNTER (OUTPATIENT)
Facility: HOSPITAL | Age: 73
Discharge: HOME | End: 2023-06-08
Payer: MEDICARE

## 2023-06-08 ENCOUNTER — HOSPITAL ENCOUNTER (OUTPATIENT)
Facility: HOSPITAL | Age: 73
End: 2023-06-08
Payer: MEDICARE

## 2023-06-08 DIAGNOSIS — M54.2 CERVICAL SPINE PAIN: ICD-10-CM

## 2023-06-08 DIAGNOSIS — R20.2 PARESTHESIA: ICD-10-CM

## 2023-06-08 PROCEDURE — 70551 MRI BRAIN STEM W/O DYE: CPT

## 2023-06-08 PROCEDURE — 72141 MRI NECK SPINE W/O DYE: CPT

## 2024-01-15 ENCOUNTER — HOSPITAL ENCOUNTER (OUTPATIENT)
Facility: HOSPITAL | Age: 74
Discharge: HOME | End: 2024-01-18
Attending: ORTHOPAEDIC SURGERY
Payer: MEDICARE

## 2024-01-15 DIAGNOSIS — M25.512 CHRONIC LEFT SHOULDER PAIN: ICD-10-CM

## 2024-01-15 DIAGNOSIS — G89.29 CHRONIC LEFT SHOULDER PAIN: ICD-10-CM

## 2024-01-15 DIAGNOSIS — M75.122 NONTRAUMATIC COMPLETE TEAR OF LEFT ROTATOR CUFF: ICD-10-CM

## 2024-01-15 PROCEDURE — 73221 MRI JOINT UPR EXTREM W/O DYE: CPT

## 2024-02-06 RX ORDER — HYDROMORPHONE HYDROCHLORIDE 1 MG/ML
0.5 INJECTION, SOLUTION INTRAMUSCULAR; INTRAVENOUS; SUBCUTANEOUS EVERY 5 MIN PRN
Status: CANCELLED | OUTPATIENT
Start: 2024-02-06

## 2024-02-06 RX ORDER — SODIUM CHLORIDE 9 MG/ML
INJECTION, SOLUTION INTRAVENOUS PRN
Status: CANCELLED | OUTPATIENT
Start: 2024-02-06

## 2024-02-06 RX ORDER — ONDANSETRON 2 MG/ML
4 INJECTION INTRAMUSCULAR; INTRAVENOUS
Status: CANCELLED | OUTPATIENT
Start: 2024-02-06 | End: 2024-02-07

## 2024-02-06 RX ORDER — FENTANYL CITRATE 50 UG/ML
25 INJECTION, SOLUTION INTRAMUSCULAR; INTRAVENOUS EVERY 5 MIN PRN
Status: CANCELLED | OUTPATIENT
Start: 2024-02-06

## 2024-02-06 RX ORDER — SODIUM CHLORIDE 0.9 % (FLUSH) 0.9 %
5-40 SYRINGE (ML) INJECTION PRN
Status: CANCELLED | OUTPATIENT
Start: 2024-02-06

## 2024-02-06 RX ORDER — SODIUM CHLORIDE 0.9 % (FLUSH) 0.9 %
5-40 SYRINGE (ML) INJECTION EVERY 12 HOURS SCHEDULED
Status: CANCELLED | OUTPATIENT
Start: 2024-02-06

## 2024-02-06 RX ORDER — HYDRALAZINE HYDROCHLORIDE 20 MG/ML
10 INJECTION INTRAMUSCULAR; INTRAVENOUS
Status: CANCELLED | OUTPATIENT
Start: 2024-02-06

## 2024-02-06 RX ORDER — PROCHLORPERAZINE EDISYLATE 5 MG/ML
5 INJECTION INTRAMUSCULAR; INTRAVENOUS
Status: CANCELLED | OUTPATIENT
Start: 2024-02-06 | End: 2024-02-07

## 2024-02-06 RX ORDER — OXYCODONE HYDROCHLORIDE 5 MG/1
5 TABLET ORAL
Status: CANCELLED | OUTPATIENT
Start: 2024-02-06 | End: 2024-02-07

## 2024-02-06 NOTE — H&P
ASSESSMENT:  1. Bilateral carpal tunnel syndrome          There is no problem list on file for this patient.        PLAN:  Treatment Plan:  I recommend surgical management.  Endoscopic carpal tunnel release.  We discussed simultaneous versus sequential releases.  He has elected for sequential.  Left side 1st.  Sedation anesthesia.  Plan is to proceed on the right side once he has recovered on the left.         Orders Placed This Encounter    Surgical Posting Sheet         Follow-up: Return for first postoperative visit.      HISTORY OF PRESENT ILLNESS:  Chief Complaint: Numbness of the Left Hand and Numbness of the Right Hand   Age: 73 y.o.  Sex: male   History of present illness: Artie Muse  is a pleasant 73 y.o. male who presents today for evaluation of bilateral hand numbness.  He also complains of pain with activity.  Dropping small objects.  Recently had a nerve conduction study and EMG.    No current facility-administered medications on file prior to encounter.     Current Outpatient Medications on File Prior to Encounter   Medication Sig Dispense Refill    amLODIPine (NORVASC) 5 MG tablet Take 10 mg by mouth daily      aspirin 81 MG chewable tablet Take 81 mg by mouth daily      atorvastatin (LIPITOR) 20 MG tablet Take 20 mg by mouth daily      nitroGLYCERIN (NITROSTAT) 0.3 MG SL tablet Place under the tongue      pantoprazole (PROTONIX) 40 MG tablet protonix 40 mg twice per day x 14 days then daily x 2 weeks         No Known Allergies    Past Medical History:   Diagnosis Date    Cancer (HCC)     prostate x 2 - 2008/2016 - prostate removed 2008 and radiation 2017    Hypertension     Ill-defined condition     increased cholesterol       Social History     Socioeconomic History    Marital status:      Spouse name: Not on file    Number of children: Not on file    Years of education: Not on file    Highest education level: Not on file   Occupational History    Not on file   Tobacco Use    Smoking status:

## 2024-02-07 ENCOUNTER — HOSPITAL ENCOUNTER (OUTPATIENT)
Facility: HOSPITAL | Age: 74
Setting detail: OUTPATIENT SURGERY
Discharge: HOME | End: 2024-02-07
Attending: ORTHOPAEDIC SURGERY | Admitting: ORTHOPAEDIC SURGERY
Payer: MEDICARE

## 2024-02-07 ENCOUNTER — ANESTHESIA (OUTPATIENT)
Facility: HOSPITAL | Age: 74
End: 2024-02-07
Payer: MEDICARE

## 2024-02-07 ENCOUNTER — ANESTHESIA EVENT (OUTPATIENT)
Facility: HOSPITAL | Age: 74
End: 2024-02-07
Payer: MEDICARE

## 2024-02-07 VITALS
DIASTOLIC BLOOD PRESSURE: 55 MMHG | HEIGHT: 68 IN | SYSTOLIC BLOOD PRESSURE: 168 MMHG | HEART RATE: 51 BPM | BODY MASS INDEX: 26.37 KG/M2 | TEMPERATURE: 97.6 F | WEIGHT: 174 LBS | RESPIRATION RATE: 16 BRPM | OXYGEN SATURATION: 98 %

## 2024-02-07 DIAGNOSIS — G56.02 LEFT CARPAL TUNNEL SYNDROME: Primary | ICD-10-CM

## 2024-02-07 PROCEDURE — 7100000000 HC PACU RECOVERY - FIRST 15 MIN: Performed by: ORTHOPAEDIC SURGERY

## 2024-02-07 PROCEDURE — 2580000003 HC RX 258: Performed by: ANESTHESIOLOGY

## 2024-02-07 PROCEDURE — 3600000002 HC SURGERY LEVEL 2 BASE: Performed by: ORTHOPAEDIC SURGERY

## 2024-02-07 PROCEDURE — 7100000001 HC PACU RECOVERY - ADDTL 15 MIN: Performed by: ORTHOPAEDIC SURGERY

## 2024-02-07 PROCEDURE — 6360000002 HC RX W HCPCS: Performed by: ORTHOPAEDIC SURGERY

## 2024-02-07 PROCEDURE — 3700000001 HC ADD 15 MINUTES (ANESTHESIA): Performed by: ORTHOPAEDIC SURGERY

## 2024-02-07 PROCEDURE — 6360000002 HC RX W HCPCS: Performed by: NURSE ANESTHETIST, CERTIFIED REGISTERED

## 2024-02-07 PROCEDURE — 2720000010 HC SURG SUPPLY STERILE: Performed by: ORTHOPAEDIC SURGERY

## 2024-02-07 PROCEDURE — 2500000003 HC RX 250 WO HCPCS: Performed by: ORTHOPAEDIC SURGERY

## 2024-02-07 PROCEDURE — 3700000000 HC ANESTHESIA ATTENDED CARE: Performed by: ORTHOPAEDIC SURGERY

## 2024-02-07 PROCEDURE — 2709999900 HC NON-CHARGEABLE SUPPLY: Performed by: ORTHOPAEDIC SURGERY

## 2024-02-07 PROCEDURE — 3600000012 HC SURGERY LEVEL 2 ADDTL 15MIN: Performed by: ORTHOPAEDIC SURGERY

## 2024-02-07 PROCEDURE — 2500000003 HC RX 250 WO HCPCS: Performed by: NURSE ANESTHETIST, CERTIFIED REGISTERED

## 2024-02-07 RX ORDER — SODIUM CHLORIDE 9 MG/ML
INJECTION, SOLUTION INTRAVENOUS PRN
Status: DISCONTINUED | OUTPATIENT
Start: 2024-02-07 | End: 2024-02-07 | Stop reason: HOSPADM

## 2024-02-07 RX ORDER — TRAMADOL HYDROCHLORIDE 50 MG/1
50 TABLET ORAL EVERY 6 HOURS PRN
Qty: 12 TABLET | Refills: 0 | Status: SHIPPED | OUTPATIENT
Start: 2024-02-07 | End: 2024-02-10

## 2024-02-07 RX ORDER — ACETAMINOPHEN 500 MG
1000 TABLET ORAL ONCE
Status: DISCONTINUED | OUTPATIENT
Start: 2024-02-07 | End: 2024-02-07 | Stop reason: HOSPADM

## 2024-02-07 RX ORDER — SODIUM CHLORIDE 0.9 % (FLUSH) 0.9 %
5-40 SYRINGE (ML) INJECTION PRN
Status: DISCONTINUED | OUTPATIENT
Start: 2024-02-07 | End: 2024-02-07 | Stop reason: HOSPADM

## 2024-02-07 RX ORDER — LIDOCAINE HYDROCHLORIDE 20 MG/ML
INJECTION, SOLUTION EPIDURAL; INFILTRATION; INTRACAUDAL; PERINEURAL PRN
Status: DISCONTINUED | OUTPATIENT
Start: 2024-02-07 | End: 2024-02-07 | Stop reason: SDUPTHER

## 2024-02-07 RX ORDER — PROPOFOL 10 MG/ML
INJECTION, EMULSION INTRAVENOUS PRN
Status: DISCONTINUED | OUTPATIENT
Start: 2024-02-07 | End: 2024-02-07 | Stop reason: SDUPTHER

## 2024-02-07 RX ORDER — SODIUM CHLORIDE 0.9 % (FLUSH) 0.9 %
5-40 SYRINGE (ML) INJECTION EVERY 12 HOURS SCHEDULED
Status: DISCONTINUED | OUTPATIENT
Start: 2024-02-07 | End: 2024-02-07 | Stop reason: HOSPADM

## 2024-02-07 RX ORDER — FENTANYL CITRATE 50 UG/ML
100 INJECTION, SOLUTION INTRAMUSCULAR; INTRAVENOUS
Status: DISCONTINUED | OUTPATIENT
Start: 2024-02-07 | End: 2024-02-07 | Stop reason: HOSPADM

## 2024-02-07 RX ORDER — LIDOCAINE HYDROCHLORIDE 10 MG/ML
1 INJECTION, SOLUTION INFILTRATION; PERINEURAL
Status: DISCONTINUED | OUTPATIENT
Start: 2024-02-07 | End: 2024-02-07 | Stop reason: HOSPADM

## 2024-02-07 RX ORDER — MIDAZOLAM HYDROCHLORIDE 2 MG/2ML
2 INJECTION, SOLUTION INTRAMUSCULAR; INTRAVENOUS
Status: DISCONTINUED | OUTPATIENT
Start: 2024-02-07 | End: 2024-02-07 | Stop reason: HOSPADM

## 2024-02-07 RX ORDER — SODIUM CHLORIDE, SODIUM LACTATE, POTASSIUM CHLORIDE, CALCIUM CHLORIDE 600; 310; 30; 20 MG/100ML; MG/100ML; MG/100ML; MG/100ML
INJECTION, SOLUTION INTRAVENOUS CONTINUOUS
Status: DISCONTINUED | OUTPATIENT
Start: 2024-02-07 | End: 2024-02-07 | Stop reason: HOSPADM

## 2024-02-07 RX ADMIN — PROPOFOL 80 MG: 10 INJECTION, EMULSION INTRAVENOUS at 07:26

## 2024-02-07 RX ADMIN — LIDOCAINE HYDROCHLORIDE 25 MG: 20 INJECTION, SOLUTION EPIDURAL; INFILTRATION; INTRACAUDAL; PERINEURAL at 07:26

## 2024-02-07 RX ADMIN — SODIUM CHLORIDE, POTASSIUM CHLORIDE, SODIUM LACTATE AND CALCIUM CHLORIDE: 600; 310; 30; 20 INJECTION, SOLUTION INTRAVENOUS at 07:21

## 2024-02-07 RX ADMIN — PROPOFOL 50 MCG/KG/MIN: 10 INJECTION, EMULSION INTRAVENOUS at 07:27

## 2024-02-07 ASSESSMENT — PAIN - FUNCTIONAL ASSESSMENT: PAIN_FUNCTIONAL_ASSESSMENT: 0-10

## 2024-02-07 ASSESSMENT — PAIN SCALES - GENERAL
PAINLEVEL_OUTOF10: 0
PAINLEVEL_OUTOF10: 0

## 2024-02-07 NOTE — OP NOTE
PREOPERATIVE DIAGNOSIS: Left carpal tunnel syndrome.     POSTOPERATIVE DIAGNOSIS: Left carpal tunnel syndrome.     PROCEDURES PERFORMED: Endoscopic left carpal tunnel release.     SURGEON: Tavon Cedillo MD     ASSISTANT: JES Somers    ANESTHESIA: Sedation.     ESTIMATED BLOOD LOSS: Minimal.     SPECIMENS REMOVED: None.     COMPLICATIONS: None.     IMPLANTS: None.     INDICATIONS FOR PROCEDURE: This is a 73 year-old male with   electrodiagnostic and clinical evidence of carpal tunnel syndrome. He has failed   conservative treatment, and wishes to proceed with endoscopic carpal tunnel   release. After discussion of the risks, benefits, potential complications   and alternatives to surgery, he has elected to proceed.     DESCRIPTION OF PROCEDURE: The patient was identified in the preoperative   holding area. Informed consent was obtained, and the operative site was   marked. The patient was then transferred to the OR, and placed supine on the   operating table. After induction of deep sedation, the planned surgical site was anesthetized.  The left upper   extremity was sterilely prepped and draped in the usual fashion. A surgical   time-out was held, and the operative site was confirmed. Preoperative   antibiotics were not given. After Esmarch exsanguination, the tourniquet was   elevated to 250 mmHg. A transverse incision was made just proximal to the   wrist flexion crease. Dissection was carried down through skin and   subcutaneous tissues, controlling bleeding with electrocautery. The   antebrachial fascia was incised sharply with a 15-blade. The proximal   aspect of the antebrachial fascia was then divided under direct   visualization. The carpal canal was entered - first with a synovial rasp,   and then serial dilators. The endoscope was placed.  Good visualization of   the transverse carpal ligament was easily obtained. The transverse carpal   ligament was then sharply divided, starting distally and

## 2024-02-07 NOTE — INTERVAL H&P NOTE
Update History & Physical    The patient's History and Physical of February 7, 2024 was reviewed with the patient and I examined the patient. There was no change. The surgical site was confirmed by the patient and me.     Plan: The risks, benefits, expected outcome, and alternative to the recommended procedure have been discussed with the patient. Patient understands and wants to proceed with the procedure.     Electronically signed by JES North on 2/7/2024 at 7:19 AM

## 2024-02-07 NOTE — DISCHARGE INSTRUCTIONS
Dr. Cedillo’s Carpal Tunnel Postoperative Instructions    Please maintain the dressing placed at surgery for 5 days. You may remove it in 5 days. Please keep the dressing clean and dry for 5 days. In 5 days you may get the wound wet and then cover it with a bandaid.  If you have any questions or problems with your dressing, please call our office at [phone].  Please elevate the operative extremity to the level of the heart to keep swelling at a minimum. You may get up to move around, but when seated please keep the extremity elevated as much as possible. This will decrease swelling and postoperative pain.  You may do activities as tolerated.  You may ice your arm/hand 5 times a day for 20 minutes at a time.  A prescription for pain medication has been sent to your pharmacy.  Take it as needed.  You may also use over the counter medications for pain.  Signs and symptoms of infection include: redness, increased pain, increased swelling not relieved by elevation, drainage, fever, or chills, If you develop any of these symptoms, call the office at [phone].  Please Follow-up at my office 14 days after surgery or when specified at your pre-operative appointment.

## 2024-02-07 NOTE — PERIOP NOTE
I have reviewed discharge instructions with the  spouse-Antonio.  The  spouse-Antonio verbalized understanding. All questions addressed at this time. A paper copy of these instructions have been given to the patient to take home.

## 2024-02-07 NOTE — ANESTHESIA POSTPROCEDURE EVALUATION
Department of Anesthesiology  Postprocedure Note    Patient: Artie Muse  MRN: [id number]  YOB: 1950  Date of evaluation: 2/7/2024    Procedure Summary       Date: 02/07/24 Room / Location: [facility] ASU  / [facility] AMBULATORY OR    Anesthesia Start: 0721 Anesthesia Stop: 0747    Procedure: LEFT ENDOSCOPIC CARPAL TUNNEL RELEASE (Left: Hand) Diagnosis:       Carpal tunnel syndrome, bilateral      (Carpal tunnel syndrome, bilateral [G56.03])    Surgeons: Tavon Cedillo MD Responsible Provider: Marisa House DO    Anesthesia Type: MAC ASA Status: 2            Anesthesia Type: MAC    Diana Phase I: Diana Score: 10    Diana Phase II:      Anesthesia Post Evaluation    Patient location during evaluation: PACU  Level of consciousness: awake  Airway patency: patent  Nausea & Vomiting: no nausea  Cardiovascular status: hemodynamically stable  Respiratory status: acceptable  Hydration status: stable  Multimodal analgesia pain management approach  Pain management: adequate    No notable events documented.

## 2024-02-07 NOTE — ANESTHESIA PRE PROCEDURE
04/19/2022 04:29 AM    BUN 15 04/19/2022 04:29 AM    CREATININE 1.04 04/19/2022 04:29 AM    GFRAA >60 04/19/2022 04:29 AM    AGRATIO 0.8 04/18/2022 05:33 PM    GLUCOSE 93 04/19/2022 04:29 AM    PROT 7.7 04/18/2022 05:33 PM    CALCIUM 8.4 04/19/2022 04:29 AM    BILITOT 0.3 04/18/2022 05:33 PM    ALKPHOS 87 04/18/2022 05:33 PM    AST 22 04/18/2022 05:33 PM    ALT 25 04/18/2022 05:33 PM       POC Tests: No results for input(s): \"POCGLU\", \"POCNA\", \"POCK\", \"POCCL\", \"POCBUN\", \"POCHEMO\", \"POCHCT\" in the last 72 hours.    Coags: No results found for: \"PROTIME\", \"INR\", \"APTT\"    HCG (If Applicable): No results found for: \"PREGTESTUR\", \"PREGSERUM\", \"HCG\", \"HCGQUANT\"     ABGs: No results found for: \"PHART\", \"PO2ART\", \"AAW8MCR\", \"KQL2MBY\", \"BEART\", \"F2JIGXVM\"     Type & Screen (If Applicable):  No results found for: \"LABABO\", \"LABRH\"    Drug/Infectious Status (If Applicable):  No results found for: \"HIV\", \"HEPCAB\"    COVID-19 Screening (If Applicable): No results found for: \"COVID19\"        Anesthesia Evaluation    Airway: Mallampati: II     Neck ROM: full  Mouth opening: > = 3 FB   Dental: normal exam         Pulmonary:normal exam  breath sounds clear to auscultation                             Cardiovascular:    (+) hypertension:, hyperlipidemia                  Neuro/Psych:               GI/Hepatic/Renal:             Endo/Other:    (+) malignancy/cancer.                 Abdominal: normal exam            Vascular:          Other Findings:       Anesthesia Plan      MAC     ASA 2       Induction: intravenous.      Anesthetic plan and risks discussed with patient.                    Marisa House DO   2/7/2024

## 2024-05-10 ENCOUNTER — TRANSCRIBE ORDERS (OUTPATIENT)
Facility: HOSPITAL | Age: 74
End: 2024-05-10

## 2024-05-10 DIAGNOSIS — N18.31 CHRONIC KIDNEY DISEASE, STAGE 3A (HCC): Primary | ICD-10-CM

## 2024-05-21 ENCOUNTER — HOSPITAL ENCOUNTER (OUTPATIENT)
Facility: HOSPITAL | Age: 74
Discharge: HOME | End: 2024-05-24
Payer: MEDICARE

## 2024-05-21 DIAGNOSIS — N18.31 CHRONIC KIDNEY DISEASE, STAGE 3A (HCC): ICD-10-CM

## 2024-05-21 PROCEDURE — 76770 US EXAM ABDO BACK WALL COMP: CPT

## (undated) DEVICE — REM POLYHESIVE ADULT PATIENT RETURN ELECTRODE: Brand: VALLEYLAB

## (undated) DEVICE — HI-TORQUE VERSACORE FLOPPY GUIDE WIRE SYSTEM 145 CM: Brand: HI-TORQUE VERSACORE

## (undated) DEVICE — SYR POWER 150ML 8IN FILL TUBE --

## (undated) DEVICE — CATH IV AUTOGRD BC BLU 22GA 25 -- INSYTE

## (undated) DEVICE — QUILTED PREMIUM COMFORT UNDERPAD,EXTRA HEAVY: Brand: WINGS

## (undated) DEVICE — GLOVE SURG SZ 75 L12IN FNGR THK79MIL GRN LTX FREE

## (undated) DEVICE — PACK PROCEDURE SURG HRT CATH

## (undated) DEVICE — CANN NASAL O2 CAPNOGRAPHY AD -- FILTERLINE

## (undated) DEVICE — INTENT OT USE PROVIDES A STERILE INTERFACE BETWEEN THE OPERATING ROOM SURGICAL LAMPS (NON-STERILE) AND THE SURGEON OR STAFF WORKING IN THE STERILE FIELD.: Brand: ASPEN® ALC PLUS LIGHT HANDLE COVER

## (undated) DEVICE — AIRLIFE™ U/CONNECT-IT OXYGEN TUBING 7 FEET (2.1 M) CRUSH-RESISTANT OXYGEN TUBING, VINYL TIPPED: Brand: AIRLIFE™

## (undated) DEVICE — CONTAINER SPEC 20 ML LID NEUT BUFF FORMALIN 10 % POLYPR STS

## (undated) DEVICE — TR BAND RADIAL ARTERY COMPRESSION DEVICE: Brand: TR BAND

## (undated) DEVICE — TRAP SURG QUAD PARABOLA SLOT DSGN SFTY SCRN TRAPEASE

## (undated) DEVICE — KIT IV STRT W CHLORAPREP PD 1ML

## (undated) DEVICE — BAG SPEC BIOHZD LF 2MIL 6X10IN -- CONVERT TO ITEM 357326

## (undated) DEVICE — DRAPE,HAND,STERILE: Brand: MEDLINE

## (undated) DEVICE — Z DISCONTINUED NO SUB IDED SET EXTN W/ 4 W STPCOCK M SPIN LOK 36IN

## (undated) DEVICE — BANDAGE,ELASTIC,ESMARK,STERILE,4"X9',LF: Brand: MEDLINE

## (undated) DEVICE — NEONATAL-ADULT SPO2 SENSOR: Brand: NELLCOR

## (undated) DEVICE — INFECTION CONTROL KIT SYS

## (undated) DEVICE — INTRODUCER SHTH 6FR L4CM SHT GRN HUB W/O GWIRE FOR DECLOT

## (undated) DEVICE — DISPOSABLE TOURNIQUET CUFF SINGLE BLADDER, DUAL PORT AND QUICK CONNECT CONNECTOR: Brand: COLOR CUFF

## (undated) DEVICE — 1200 GUARD II KIT W/5MM TUBE W/O VAC TUBE: Brand: GUARDIAN

## (undated) DEVICE — KIT ANGIOGRAPHY CUST MRMC

## (undated) DEVICE — TUBING PRSS MON L6IN PVC M FEM CONN

## (undated) DEVICE — GOWN,SIRUS,NONRNF,SETINSLV,XL,20/CS: Brand: MEDLINE

## (undated) DEVICE — SPONGE GZ W4XL4IN COT 12 PLY TYP VII WVN C FLD DSGN STERILE

## (undated) DEVICE — GARMENT,MEDLINE,DVT,INT,CALF,MED, GEN2: Brand: MEDLINE

## (undated) DEVICE — SPLINT WR POS F/ARTERIAL ACC -- BX/10

## (undated) DEVICE — BANDAGE COMPR W3INXL5YD WHT BGE POLY COT M E WRP WV HK AND

## (undated) DEVICE — SYRINGE KIT,PACKAGED,,150FT,MK 7(ANGIO-ARTERION, 150ML SYR KIT W/QFT,MC)(60729385): Brand: MEDRAD® MARK 7 ARTERION DISPOSABLE SYRINGE 150 ML WITH QUICK FILL TUBE

## (undated) DEVICE — CATHETER ETER ANGIO L110CM OD5FR ID046IN L75CM 038IN 145DEG CARD

## (undated) DEVICE — SNARE ENDOSCP M L240CM W27MM SHTH DIA2.4MM CHN 2.8MM OVL

## (undated) DEVICE — Device

## (undated) DEVICE — ENDO CARRY-ON PROCEDURE KIT INCLUDES ENZYMATIC SPONGE, GAUZE, BIOHAZARD LABEL, TRAY, LUBRICANT, DIRTY SCOPE LABEL, WATER LABEL, TRAY, DRAWSTRING PAD, AND DEFENDO 4-PIECE KIT.: Brand: ENDO CARRY-ON PROCEDURE KIT

## (undated) DEVICE — APPLICATOR MEDICATED 26 CC SOLUTION HI LT ORNG CHLORAPREP

## (undated) DEVICE — SOLUTION IRRIG 1000ML 0.9% SOD CHL USP POUR PLAS BTL

## (undated) DEVICE — DRAPE,REIN 53X77,STERILE: Brand: MEDLINE

## (undated) DEVICE — RADIFOCUS OPTITORQUE ANGIOGRAPHIC CATHETER: Brand: OPTITORQUE

## (undated) DEVICE — NEEDLE HYPO 18GA L1.5IN PNK S STL HUB POLYPR SHLD REG BVL

## (undated) DEVICE — Z DISCONTINUED USE 2751540 TUBING IRRIG L10IN DISP PMP ENDOGATOR

## (undated) DEVICE — BAG BELONG PT PERS CLEAR HANDL

## (undated) DEVICE — MEDI-TRACE CADENCE ADULT, DEFIBRILLATION ELECTRODE -RTS  (10 PR/PK) - PHILIPS: Brand: MEDI-TRACE CADENCE

## (undated) DEVICE — SUTURE NONABSORBABLE MONOFILAMENT 4-0 PS-2 18 IN BLK ETHILON 1667G

## (undated) DEVICE — SYRINGE MED 20ML STD CLR PLAS LUERLOCK TIP N CTRL DISP

## (undated) DEVICE — SYRINGE ANGIO 10 CC BRL STD PRNT POLYCARB LT BLU MEDALLION

## (undated) DEVICE — STANDARD (U) BLADE ASSEMBLY 1PK: Brand: MICROAIRE®

## (undated) DEVICE — CORD ES L12FT BPLR FRCP

## (undated) DEVICE — HYPODERMIC SAFETY NEEDLE: Brand: MONOJECT

## (undated) DEVICE — GLOVE SURG SZ 6 L12IN FNGR THK79MIL GRN LTX FREE

## (undated) DEVICE — GUIDEWIRE VASC L260CM 0.035IN J TIP L3MM PTFE FIX COR NAMIC

## (undated) DEVICE — SOLIDIFIER FLUID 3000 CC ABSORB

## (undated) DEVICE — SYRINGE MED 10ML LUERLOCK TIP W/O SFTY DISP

## (undated) DEVICE — BW-412T DISP COMBO CLEANING BRUSH: Brand: SINGLE USE COMBINATION CLEANING BRUSH

## (undated) DEVICE — DRESSING,GAUZE,XEROFORM,CURAD,1"X8",ST: Brand: CURAD

## (undated) DEVICE — KENDALL RADIOLUCENT FOAM MONITORING ELECTRODE -RECTANGULAR SHAPE: Brand: KENDALL

## (undated) DEVICE — CONNECTOR TBNG AUX H2O JET DISP FOR OLY 160/180 SER

## (undated) DEVICE — KIT,ANTI FOG,W/SPONGE & FLUID,SOFT PACK: Brand: MEDLINE

## (undated) DEVICE — MINOR BASIN -SMH: Brand: MEDLINE INDUSTRIES, INC.

## (undated) DEVICE — PADDING CAST W4INXL4YD ST COT RAYON MICROPLEATED HIGHLY

## (undated) DEVICE — SET ADMIN 16ML TBNG L100IN 2 Y INJ SITE IV PIGGY BK DISP

## (undated) DEVICE — 3M™ TEGADERM™ TRANSPARENT FILM DRESSING FRAME STYLE, 1626W, 4 IN X 4-3/4 IN (10 CM X 12 CM), 50/CT 4CT/CASE: Brand: 3M™ TEGADERM™

## (undated) DEVICE — APC PROBE 2200 C, SINGLE USE OD 2.3MM/6.9FR; L 2.2M/7.2FT: Brand: ERBE

## (undated) DEVICE — AIRLIFE™ ADULT CUSHION NASAL CANNULA 14 FOOT (4.3) CRUSH-RESISTANT OXYGEN TUBING, AND U/CONNECT-IT ADAPTER: Brand: AIRLIFE™